# Patient Record
Sex: FEMALE | Race: WHITE | NOT HISPANIC OR LATINO | URBAN - METROPOLITAN AREA
[De-identification: names, ages, dates, MRNs, and addresses within clinical notes are randomized per-mention and may not be internally consistent; named-entity substitution may affect disease eponyms.]

---

## 2020-09-08 ENCOUNTER — APPOINTMENT (RX ONLY)
Dept: URBAN - METROPOLITAN AREA CLINIC 374 | Facility: CLINIC | Age: 40
Setting detail: DERMATOLOGY
End: 2020-09-08

## 2020-09-08 DIAGNOSIS — L73.9 FOLLICULAR DISORDER, UNSPECIFIED: ICD-10-CM

## 2020-09-08 DIAGNOSIS — L738 OTHER SPECIFIED DISEASES OF HAIR AND HAIR FOLLICLES: ICD-10-CM

## 2020-09-08 DIAGNOSIS — L663 OTHER SPECIFIED DISEASES OF HAIR AND HAIR FOLLICLES: ICD-10-CM

## 2020-09-08 PROBLEM — L02.221 FURUNCLE OF ABDOMINAL WALL: Status: ACTIVE | Noted: 2020-09-08

## 2020-09-08 PROCEDURE — ? PRESCRIPTION MEDICATION MANAGEMENT

## 2020-09-08 PROCEDURE — 99203 OFFICE O/P NEW LOW 30 MIN: CPT

## 2020-09-08 PROCEDURE — ? COUNSELING

## 2020-09-08 PROCEDURE — ? ORDER TESTS

## 2020-09-08 PROCEDURE — ? PRESCRIPTION

## 2020-09-08 RX ORDER — CHLORHEXIDINE GLUCONATE 213 G/1000ML
SOLUTION TOPICAL
Qty: 1 | Refills: 3 | Status: ERX | COMMUNITY
Start: 2020-09-08

## 2020-09-08 RX ORDER — SULFAMETHOXAZOLE AND TRIMETHOPRIM 800; 160 MG/1; MG/1
TABLET ORAL BID
Qty: 60 | Refills: 3 | Status: ERX | COMMUNITY
Start: 2020-09-08

## 2020-09-08 RX ORDER — TRIAMCINOLONE ACETONIDE 1 MG/G
OINTMENT TOPICAL
Qty: 1 | Refills: 3 | Status: ERX | COMMUNITY
Start: 2020-09-08

## 2020-09-08 RX ORDER — MUPIROCIN 20 MG/G
OINTMENT TOPICAL BID
Qty: 1 | Refills: 3 | Status: ERX | COMMUNITY
Start: 2020-09-08

## 2020-09-08 RX ADMIN — SULFAMETHOXAZOLE AND TRIMETHOPRIM: 800; 160 TABLET ORAL at 00:00

## 2020-09-08 RX ADMIN — TRIAMCINOLONE ACETONIDE: 1 OINTMENT TOPICAL at 00:00

## 2020-09-08 RX ADMIN — MUPIROCIN: 20 OINTMENT TOPICAL at 00:00

## 2020-09-08 RX ADMIN — CHLORHEXIDINE GLUCONATE: 213 SOLUTION TOPICAL at 00:00

## 2020-09-08 ASSESSMENT — LOCATION SIMPLE DESCRIPTION DERM: LOCATION SIMPLE: ABDOMEN

## 2020-09-08 ASSESSMENT — LOCATION DETAILED DESCRIPTION DERM
LOCATION DETAILED: LEFT LATERAL ABDOMEN
LOCATION DETAILED: PERIUMBILICAL SKIN

## 2020-09-08 ASSESSMENT — LOCATION ZONE DERM: LOCATION ZONE: TRUNK

## 2020-09-08 NOTE — PROCEDURE: PRESCRIPTION MEDICATION MANAGEMENT
Detail Level: Zone
Initiate Treatment: mupirocin 2 % topical ointment Bid; Apply to aa bid mix with Triamcinolone\\n\\nBactrim  mg-160 mg tablet Bid; well Take one tab po bid\\n\\ntriamcinolone acetonide 0.1 % topical ointment; Apply to affected area bid mix with mupirocin\\n\\nHibiclens 4 % topical liquid; Wash affected area of body qday
Render In Strict Bullet Format?: No

## 2020-10-06 ENCOUNTER — APPOINTMENT (RX ONLY)
Dept: URBAN - METROPOLITAN AREA CLINIC 374 | Facility: CLINIC | Age: 40
Setting detail: DERMATOLOGY
End: 2020-10-06

## 2020-10-06 DIAGNOSIS — L738 OTHER SPECIFIED DISEASES OF HAIR AND HAIR FOLLICLES: ICD-10-CM | Status: IMPROVED

## 2020-10-06 DIAGNOSIS — L73.9 FOLLICULAR DISORDER, UNSPECIFIED: ICD-10-CM | Status: IMPROVED

## 2020-10-06 DIAGNOSIS — K12.0 RECURRENT ORAL APHTHAE: ICD-10-CM

## 2020-10-06 DIAGNOSIS — L53.8 OTHER SPECIFIED ERYTHEMATOUS CONDITIONS: ICD-10-CM

## 2020-10-06 DIAGNOSIS — L24.9 IRRITANT CONTACT DERMATITIS, UNSPECIFIED CAUSE: ICD-10-CM

## 2020-10-06 DIAGNOSIS — L663 OTHER SPECIFIED DISEASES OF HAIR AND HAIR FOLLICLES: ICD-10-CM | Status: IMPROVED

## 2020-10-06 PROBLEM — L02.221 FURUNCLE OF ABDOMINAL WALL: Status: ACTIVE | Noted: 2020-10-06

## 2020-10-06 PROCEDURE — ? PRESCRIPTION

## 2020-10-06 PROCEDURE — ? ORDER TESTS

## 2020-10-06 PROCEDURE — 99213 OFFICE O/P EST LOW 20 MIN: CPT

## 2020-10-06 PROCEDURE — ? COUNSELING

## 2020-10-06 PROCEDURE — ? PRESCRIPTION MEDICATION MANAGEMENT

## 2020-10-06 PROCEDURE — ? INTRALESIONAL KENALOG

## 2020-10-06 RX ORDER — TRIAMCINOLONE ACETONIDE 1 MG/G
CREAM TOPICAL BID
Qty: 1 | Refills: 2 | Status: ERX | COMMUNITY
Start: 2020-10-06

## 2020-10-06 RX ORDER — SULFAMETHOXAZOLE AND TRIMETHOPRIM 800; 160 MG/1; MG/1
TABLET ORAL BID
Qty: 60 | Refills: 3 | Status: ACTIVE

## 2020-10-06 RX ORDER — MUPIROCIN 20 MG/G
OINTMENT TOPICAL BID
Qty: 1 | Refills: 3 | Status: ACTIVE

## 2020-10-06 RX ORDER — TRIAMCINOLONE ACETONIDE 1 MG/G
OINTMENT TOPICAL
Qty: 1 | Refills: 3 | Status: ACTIVE

## 2020-10-06 RX ORDER — TRIAMCINOLONE ACETONIDE 1 MG/G
PASTE DENTAL
Qty: 1 | Refills: 3 | Status: ERX | COMMUNITY
Start: 2020-10-06

## 2020-10-06 RX ORDER — CHLORHEXIDINE GLUCONATE 213 G/1000ML
SOLUTION TOPICAL
Qty: 1 | Refills: 3 | Status: ACTIVE

## 2020-10-06 RX ADMIN — TRIAMCINOLONE ACETONIDE: 1 CREAM TOPICAL at 00:00

## 2020-10-06 RX ADMIN — TRIAMCINOLONE ACETONIDE: 1 PASTE DENTAL at 00:00

## 2020-10-06 ASSESSMENT — LOCATION SIMPLE DESCRIPTION DERM
LOCATION SIMPLE: ABDOMEN
LOCATION SIMPLE: RIGHT BREAST
LOCATION SIMPLE: CHEST

## 2020-10-06 ASSESSMENT — LOCATION DETAILED DESCRIPTION DERM
LOCATION DETAILED: PERIUMBILICAL SKIN
LOCATION DETAILED: EPIGASTRIC SKIN
LOCATION DETAILED: LEFT LATERAL ABDOMEN
LOCATION DETAILED: LEFT MEDIAL SUPERIOR CHEST
LOCATION DETAILED: RIGHT MEDIAL BREAST 3-4:00 REGION

## 2020-10-06 ASSESSMENT — LOCATION ZONE DERM: LOCATION ZONE: TRUNK

## 2020-10-06 NOTE — PROCEDURE: INTRALESIONAL KENALOG
X Size Of Lesion In Cm (Optional): 0
Administered By (Optional): Leesa
Kenalog Preparation: Kenalog
Detail Level: Detailed
Concentration (Mg/Ml): 5.0
Consent: The risks of atrophy were reviewed with the patient.
Include Z78.9 (Other Specified Conditions Influencing Health Status) As An Associated Diagnosis?: No
Total Volume (Ccs- Only Use Numbers And Decimals): 1
Medical Necessity Clause: This procedure was medically necessary because the lesions that were treated were:

## 2020-10-06 NOTE — PROCEDURE: PRESCRIPTION MEDICATION MANAGEMENT
Detail Level: Zone
Initiate Treatment: mupirocin 2 % topical ointment Bid; Apply to aa bid mix with Triamcinolone\\n\\nBactrim  mg-160 mg tablet Bid; well Take one tab po bid\\n\\ntriamcinolone acetonide 0.1 % topical ointment; Apply to affected area bid mix with mupirocin\\n\\nHibiclens 4 % topical liquid; Wash affected area of body qday
Render In Strict Bullet Format?: No
Initiate Treatment: TAC 0.1% cream BID

## 2020-11-03 ENCOUNTER — APPOINTMENT (RX ONLY)
Dept: URBAN - METROPOLITAN AREA CLINIC 374 | Facility: CLINIC | Age: 40
Setting detail: DERMATOLOGY
End: 2020-11-03

## 2020-11-03 DIAGNOSIS — L21.8 OTHER SEBORRHEIC DERMATITIS: ICD-10-CM

## 2020-11-03 DIAGNOSIS — L738 OTHER SPECIFIED DISEASES OF HAIR AND HAIR FOLLICLES: ICD-10-CM | Status: IMPROVED

## 2020-11-03 DIAGNOSIS — K12.0 RECURRENT ORAL APHTHAE: ICD-10-CM | Status: IMPROVED

## 2020-11-03 DIAGNOSIS — L73.9 FOLLICULAR DISORDER, UNSPECIFIED: ICD-10-CM | Status: IMPROVED

## 2020-11-03 DIAGNOSIS — L663 OTHER SPECIFIED DISEASES OF HAIR AND HAIR FOLLICLES: ICD-10-CM | Status: IMPROVED

## 2020-11-03 DIAGNOSIS — L24.9 IRRITANT CONTACT DERMATITIS, UNSPECIFIED CAUSE: ICD-10-CM | Status: IMPROVED

## 2020-11-03 PROBLEM — L02.221 FURUNCLE OF ABDOMINAL WALL: Status: ACTIVE | Noted: 2020-11-03

## 2020-11-03 PROCEDURE — ? COUNSELING

## 2020-11-03 PROCEDURE — ? PRESCRIPTION

## 2020-11-03 PROCEDURE — ? INTRALESIONAL KENALOG

## 2020-11-03 PROCEDURE — ? PRESCRIPTION MEDICATION MANAGEMENT

## 2020-11-03 PROCEDURE — 99213 OFFICE O/P EST LOW 20 MIN: CPT

## 2020-11-03 RX ORDER — CLOBETASOL PROPIONATE 0.5 MG/ML
SOLUTION TOPICAL QHS
Qty: 1 | Refills: 3 | Status: ERX | COMMUNITY
Start: 2020-11-03

## 2020-11-03 RX ADMIN — CLOBETASOL PROPIONATE: 0.5 SOLUTION TOPICAL at 00:00

## 2020-11-03 ASSESSMENT — LOCATION SIMPLE DESCRIPTION DERM
LOCATION SIMPLE: POSTERIOR SCALP
LOCATION SIMPLE: ABDOMEN
LOCATION SIMPLE: RIGHT CHEEK
LOCATION SIMPLE: CHEST
LOCATION SIMPLE: LEFT CHEEK

## 2020-11-03 ASSESSMENT — LOCATION DETAILED DESCRIPTION DERM
LOCATION DETAILED: RIGHT MEDIAL BUCCAL CHEEK
LOCATION DETAILED: LEFT LATERAL ABDOMEN
LOCATION DETAILED: POSTERIOR MID-PARIETAL SCALP
LOCATION DETAILED: PERIUMBILICAL SKIN
LOCATION DETAILED: LEFT MEDIAL SUPERIOR CHEST
LOCATION DETAILED: LEFT MEDIAL BUCCAL CHEEK

## 2020-11-03 ASSESSMENT — LOCATION ZONE DERM
LOCATION ZONE: SCALP
LOCATION ZONE: FACE
LOCATION ZONE: TRUNK

## 2020-11-03 NOTE — PROCEDURE: PRESCRIPTION MEDICATION MANAGEMENT
Continue Regimen: mupirocin 2 % topical ointment Bid; Apply to aa bid mix with Triamcinolone\\nBactrim  mg-160 mg tablet Bid; well Take one tab po bid\\ntriamcinolone acetonide 0.1 % topical ointment; Apply to affected area bid mix with mupirocin\\nHibiclens 4 % topical liquid; Wash affected area of body qday
Detail Level: Zone
Render In Strict Bullet Format?: No
Continue Regimen: TAC 0.1% cream BID
Continue Regimen: triamcinolone acetonide 0.1 % dental paste, Apply BID to AA around mouth
Detail Level: Simple
Initiate Treatment: Clobetasol 0.05% scalp solution qhs to scalp
Otc Regimen: Dove [facility] shampoo

## 2020-11-03 NOTE — PROCEDURE: INTRALESIONAL KENALOG
X Size Of Lesion In Cm (Optional): 0
Medical Necessity Clause: This procedure was medically necessary because the lesions that were treated were:
Detail Level: Simple
Treatment Number (Optional): 2
Include Z78.9 (Other Specified Conditions Influencing Health Status) As An Associated Diagnosis?: No
Concentration (Mg/Ml): 5.0
Kenalog Preparation: Kenalog
Total Volume (Ccs- Only Use Numbers And Decimals): 0.7
Consent: The risks of atrophy were reviewed with the patient.
Administered By (Optional): Melanie

## 2020-12-01 ENCOUNTER — APPOINTMENT (RX ONLY)
Dept: URBAN - METROPOLITAN AREA CLINIC 374 | Facility: CLINIC | Age: 40
Setting detail: DERMATOLOGY
End: 2020-12-01

## 2020-12-01 DIAGNOSIS — L738 OTHER SPECIFIED DISEASES OF HAIR AND HAIR FOLLICLES: ICD-10-CM

## 2020-12-01 DIAGNOSIS — L73.9 FOLLICULAR DISORDER, UNSPECIFIED: ICD-10-CM

## 2020-12-01 DIAGNOSIS — L663 OTHER SPECIFIED DISEASES OF HAIR AND HAIR FOLLICLES: ICD-10-CM

## 2020-12-01 DIAGNOSIS — L72.8 OTHER FOLLICULAR CYSTS OF THE SKIN AND SUBCUTANEOUS TISSUE: ICD-10-CM

## 2020-12-01 DIAGNOSIS — L24.9 IRRITANT CONTACT DERMATITIS, UNSPECIFIED CAUSE: ICD-10-CM

## 2020-12-01 PROBLEM — L02.221 FURUNCLE OF ABDOMINAL WALL: Status: ACTIVE | Noted: 2020-12-01

## 2020-12-01 PROCEDURE — ? PRESCRIPTION MEDICATION MANAGEMENT

## 2020-12-01 PROCEDURE — 99213 OFFICE O/P EST LOW 20 MIN: CPT | Mod: 25

## 2020-12-01 PROCEDURE — 11900 INJECT SKIN LESIONS </W 7: CPT

## 2020-12-01 PROCEDURE — ? INTRALESIONAL KENALOG

## 2020-12-01 ASSESSMENT — LOCATION ZONE DERM
LOCATION ZONE: TRUNK
LOCATION ZONE: FACE

## 2020-12-01 ASSESSMENT — LOCATION DETAILED DESCRIPTION DERM
LOCATION DETAILED: LEFT MEDIAL SUPERIOR CHEST
LOCATION DETAILED: LEFT LATERAL ABDOMEN
LOCATION DETAILED: LEFT SUPRAPUBIC SKIN
LOCATION DETAILED: PERIUMBILICAL SKIN
LOCATION DETAILED: RIGHT INFERIOR LATERAL FOREHEAD

## 2020-12-01 ASSESSMENT — LOCATION SIMPLE DESCRIPTION DERM
LOCATION SIMPLE: GROIN
LOCATION SIMPLE: RIGHT FOREHEAD
LOCATION SIMPLE: CHEST
LOCATION SIMPLE: ABDOMEN

## 2020-12-01 NOTE — PROCEDURE: PRESCRIPTION MEDICATION MANAGEMENT
Continue Regimen: mupirocin 2 % topical ointment Bid; Apply to aa bid mix with Triamcinolone\\nBactrim  mg-160 mg tablet Bid; well Take one tab po bid\\ntriamcinolone acetonide 0.1 % topical ointment; Apply to affected area bid mix with mupirocin\\nHibiclens 4 % topical liquid; Wash affected area of body qday
Detail Level: Zone
Render In Strict Bullet Format?: No
Continue Regimen: TAC 0.1% cream BID

## 2020-12-01 NOTE — PROCEDURE: INTRALESIONAL KENALOG
X Size Of Lesion In Cm (Optional): 0
Medical Necessity Clause: This procedure was medically necessary because the lesions that were treated were:
Detail Level: Simple
Treatment Number (Optional): 3
Include Z78.9 (Other Specified Conditions Influencing Health Status) As An Associated Diagnosis?: No
Concentration (Mg/Ml): 5.0
Kenalog Preparation: Kenalog
Total Volume (Ccs- Only Use Numbers And Decimals): 0.7
Consent: The risks of atrophy were reviewed with the patient.
Administered By (Optional): Leesa
Detail Level: Detailed
Concentration Of Solution Injected (Mg/Ml): 2.5
Total Volume Injected (Ccs- Only Use Numbers And Decimals): 1
Administered By (Optional): Leesa

## 2021-01-21 ENCOUNTER — RX ONLY (OUTPATIENT)
Age: 41
Setting detail: RX ONLY
End: 2021-01-21

## 2021-01-21 RX ORDER — SULFAMETHOXAZOLE AND TRIMETHOPRIM 800; 160 MG/1; MG/1
TABLET ORAL BID
Qty: 60 | Refills: 0 | Status: ERX

## 2021-12-15 RX ADMIN — SILVER SULFADIAZINE 1: 10 CREAM TOPICAL at 00:00

## 2021-12-15 RX ADMIN — Medication 1: at 00:00

## 2021-12-15 RX ADMIN — CLOBETASOL PROPIONATE 1: 0.5 OINTMENT TOPICAL at 00:00

## 2021-12-16 ENCOUNTER — APPOINTMENT (RX ONLY)
Dept: URBAN - METROPOLITAN AREA CLINIC 374 | Facility: CLINIC | Age: 41
Setting detail: DERMATOLOGY
End: 2021-12-16

## 2021-12-16 DIAGNOSIS — L30.8 OTHER SPECIFIED DERMATITIS: ICD-10-CM | Status: INADEQUATELY CONTROLLED

## 2021-12-16 PROCEDURE — ? PRESCRIPTION

## 2021-12-16 PROCEDURE — ? PRESCRIPTION MEDICATION MANAGEMENT

## 2021-12-16 PROCEDURE — ? RECOMMENDATIONS

## 2021-12-16 PROCEDURE — ? COUNSELING

## 2021-12-16 PROCEDURE — ? ADDITIONAL NOTES

## 2021-12-16 PROCEDURE — 99214 OFFICE O/P EST MOD 30 MIN: CPT

## 2021-12-16 RX ORDER — SILVER SULFADIAZINE 10 MG/G
1 CREAM TOPICAL QDAY
Qty: 1000 | Refills: 3 | Status: ERX | COMMUNITY
Start: 2021-12-15

## 2021-12-16 RX ORDER — CLOBETASOL PROPIONATE 0.5 MG/G
1 OINTMENT TOPICAL BID
Qty: 60 | Refills: 3 | Status: ERX | COMMUNITY
Start: 2021-12-15

## 2021-12-16 RX ORDER — CETIRIZINE HCL 10 MG
1 CAPSULE ORAL QAM
Qty: 30 | Refills: 3 | Status: ERX | COMMUNITY
Start: 2021-12-15

## 2021-12-16 ASSESSMENT — LOCATION SIMPLE DESCRIPTION DERM: LOCATION SIMPLE: ABDOMEN

## 2021-12-16 ASSESSMENT — LOCATION DETAILED DESCRIPTION DERM: LOCATION DETAILED: PERIUMBILICAL SKIN

## 2021-12-16 ASSESSMENT — LOCATION ZONE DERM: LOCATION ZONE: TRUNK

## 2021-12-16 NOTE — PROCEDURE: RECOMMENDATIONS
Recommendations (Free Text): STOP PICKING
Recommendation Preamble: The following recommendations were made during the visit:
Detail Level: Zone
Render Risk Assessment In Note?: no

## 2021-12-16 NOTE — PROCEDURE: MIPS QUALITY
Quality 226: Preventive Care And Screening: Tobacco Use: Screening And Cessation Intervention: Patient screened for tobacco use, is a smoker AND received Cessation Counseling
Quality 130: Documentation Of Current Medications In The Medical Record: Current Medications Documented
Quality 431: Preventive Care And Screening: Unhealthy Alcohol Use - Screening: Patient not identified as an unhealthy alcohol user when screened for unhealthy alcohol use using a systematic screening method
Detail Level: Detailed

## 2021-12-16 NOTE — PROCEDURE: ADDITIONAL NOTES
Additional Notes: Patient consent was obtained to proceed with the visit and recommended plan of care after discussion of all risks and benefits, including the risks of COVID-19 exposure.
Render Risk Assessment In Note?: yes
Detail Level: Zone

## 2021-12-16 NOTE — PROCEDURE: PRESCRIPTION MEDICATION MANAGEMENT
Detail Level: Zone
Initiate Treatment: cetirizine 10mg capsule: Take one tab po QAM\\nSilvadene 1% topical cream: Apply thin layer to stomach area qday\\nclobetasol 0.05% topical ointment: Apply a thin layer to aa of body bid for itching
Render In Strict Bullet Format?: No

## 2022-03-14 ENCOUNTER — TELEPHONE (OUTPATIENT)
Dept: ENDOCRINOLOGY | Facility: CLINIC | Age: 42
End: 2022-03-14

## 2022-03-14 NOTE — TELEPHONE ENCOUNTER
Patient is requesting a call back to schedule a new patient apt , she has [employer] insurance and did not have her id # , she will have her id # when she receives the return call.  She was referred by Dr. Rosey Velasco for irregular periods she has not had a period in 2 months    yes

## 2022-05-18 ENCOUNTER — TELEPHONE (OUTPATIENT)
Dept: ENDOCRINOLOGY | Facility: CLINIC | Age: 42
End: 2022-05-18
Payer: COMMERCIAL

## 2022-05-18 DIAGNOSIS — N91.2 AMENORRHEA: ICD-10-CM

## 2022-05-18 DIAGNOSIS — R73.03 PREDIABETES: ICD-10-CM

## 2022-05-18 DIAGNOSIS — R79.89 ABNORMAL THYROID BLOOD TEST: Primary | ICD-10-CM

## 2022-05-18 NOTE — TELEPHONE ENCOUNTER
Spoke to patient about labs and office notes.      Not seeing anything in chart    She is going to call her doctor's office and have them send them over.    Will call back to confirm

## 2022-05-19 ENCOUNTER — OFFICE VISIT (OUTPATIENT)
Dept: ENDOCRINOLOGY | Facility: CLINIC | Age: 42
End: 2022-05-19
Payer: COMMERCIAL

## 2022-05-19 VITALS
DIASTOLIC BLOOD PRESSURE: 70 MMHG | HEART RATE: 90 BPM | WEIGHT: 293 LBS | RESPIRATION RATE: 22 BRPM | OXYGEN SATURATION: 91 % | SYSTOLIC BLOOD PRESSURE: 120 MMHG | HEIGHT: 63 IN | BODY MASS INDEX: 51.91 KG/M2 | TEMPERATURE: 97.8 F

## 2022-05-19 DIAGNOSIS — N91.1 SECONDARY AMENORRHEA: ICD-10-CM

## 2022-05-19 DIAGNOSIS — R73.03 PREDIABETES: ICD-10-CM

## 2022-05-19 DIAGNOSIS — R79.89 ABNORMAL THYROID BLOOD TEST: Primary | ICD-10-CM

## 2022-05-19 DIAGNOSIS — Z71.6 ENCOUNTER FOR SMOKING CESSATION COUNSELING: ICD-10-CM

## 2022-05-19 DIAGNOSIS — E66.01 MORBID OBESITY (CMS/HCC): ICD-10-CM

## 2022-05-19 PROBLEM — M54.50 CHRONIC BILATERAL LOW BACK PAIN WITHOUT SCIATICA: Status: ACTIVE | Noted: 2020-04-02

## 2022-05-19 PROBLEM — S92.403A: Status: ACTIVE | Noted: 2018-07-08

## 2022-05-19 PROBLEM — G47.34 NOCTURNAL OXYGEN DESATURATION: Status: ACTIVE | Noted: 2022-04-01

## 2022-05-19 PROBLEM — K82.9 GALLBLADDER PROBLEM: Status: ACTIVE | Noted: 2021-10-05

## 2022-05-19 PROBLEM — R60.0 EDEMA OF LOWER EXTREMITY: Status: ACTIVE | Noted: 2021-04-19

## 2022-05-19 PROBLEM — I82.90 THROMBUS: Status: ACTIVE | Noted: 2022-05-19

## 2022-05-19 PROBLEM — G47.33 OSA TREATED WITH BIPAP: Status: ACTIVE | Noted: 2021-05-19

## 2022-05-19 PROBLEM — M77.32 HEEL SPUR, LEFT: Status: ACTIVE | Noted: 2020-08-10

## 2022-05-19 PROBLEM — F33.1 MODERATE EPISODE OF RECURRENT MAJOR DEPRESSIVE DISORDER (CMS/HCC): Status: ACTIVE | Noted: 2021-05-19

## 2022-05-19 PROBLEM — G89.29 CHRONIC BILATERAL LOW BACK PAIN WITHOUT SCIATICA: Status: ACTIVE | Noted: 2020-04-02

## 2022-05-19 PROBLEM — M17.0 OSTEOARTHRITIS OF BOTH KNEES: Status: ACTIVE | Noted: 2020-03-04

## 2022-05-19 PROBLEM — G43.909 MIGRAINE: Status: ACTIVE | Noted: 2018-07-08

## 2022-05-19 PROBLEM — R11.0 NAUSEA: Status: ACTIVE | Noted: 2018-07-08

## 2022-05-19 PROBLEM — Z99.81 ON HOME OXYGEN THERAPY: Status: ACTIVE | Noted: 2022-01-17

## 2022-05-19 PROBLEM — R06.09 DYSPNEA ON EXERTION: Status: ACTIVE | Noted: 2021-04-19

## 2022-05-19 PROBLEM — J44.9 COPD (CHRONIC OBSTRUCTIVE PULMONARY DISEASE) (CMS/HCC): Status: ACTIVE | Noted: 2020-03-10

## 2022-05-19 PROBLEM — N84.0 POLYP OF CORPUS UTERI: Status: ACTIVE | Noted: 2018-07-08

## 2022-05-19 PROBLEM — J96.11 CHRONIC HYPOXEMIC RESPIRATORY FAILURE (CMS/HCC): Status: ACTIVE | Noted: 2021-07-08

## 2022-05-19 PROBLEM — I82.402 LEFT LEG DVT (CMS/HCC): Status: ACTIVE | Noted: 2022-01-18

## 2022-05-19 PROBLEM — M67.972 DISORDER OF LEFT ACHILLES TENDON: Status: ACTIVE | Noted: 2020-11-07

## 2022-05-19 PROBLEM — K08.199 SURGICAL ABSENCE OF TEETH: Status: ACTIVE | Noted: 2022-05-19

## 2022-05-19 PROBLEM — F41.1 GAD (GENERALIZED ANXIETY DISORDER): Status: ACTIVE | Noted: 2021-05-19

## 2022-05-19 PROBLEM — J45.909 ASTHMA: Status: ACTIVE | Noted: 2018-07-08

## 2022-05-19 PROBLEM — F12.90 MARIJUANA USE: Status: ACTIVE | Noted: 2020-02-03

## 2022-05-19 PROBLEM — F32.A DEPRESSIVE DISORDER: Status: ACTIVE | Noted: 2018-07-08

## 2022-05-19 PROBLEM — D64.9 ANEMIA: Status: ACTIVE | Noted: 2020-09-24

## 2022-05-19 PROBLEM — R26.2 DISABILITY OF WALKING: Status: ACTIVE | Noted: 2021-04-19

## 2022-05-19 PROBLEM — R44.1 VISUAL HALLUCINATIONS: Status: ACTIVE | Noted: 2021-05-19

## 2022-05-19 PROBLEM — R06.83 SNORING: Status: ACTIVE | Noted: 2021-10-06

## 2022-05-19 PROBLEM — S96.919A STRAIN OF FOOT: Status: ACTIVE | Noted: 2018-07-08

## 2022-05-19 PROBLEM — Z90.49 HISTORY OF CHOLECYSTECTOMY: Status: ACTIVE | Noted: 2021-03-29

## 2022-05-19 PROBLEM — L73.2 HIDRADENITIS SUPPURATIVA: Status: ACTIVE | Noted: 2020-08-07

## 2022-05-19 PROCEDURE — 99406 BEHAV CHNG SMOKING 3-10 MIN: CPT | Performed by: INTERNAL MEDICINE

## 2022-05-19 PROCEDURE — 99204 OFFICE O/P NEW MOD 45 MIN: CPT | Mod: 25 | Performed by: INTERNAL MEDICINE

## 2022-05-19 PROCEDURE — 3008F BODY MASS INDEX DOCD: CPT | Performed by: INTERNAL MEDICINE

## 2022-05-19 RX ORDER — IBUPROFEN 200 MG
TABLET ORAL
COMMUNITY
Start: 2021-12-10 | End: 2023-03-15

## 2022-05-19 RX ORDER — OMEPRAZOLE 20 MG/1
20 CAPSULE, DELAYED RELEASE ORAL DAILY PRN
COMMUNITY
Start: 2022-02-01 | End: 2023-03-15

## 2022-05-19 RX ORDER — QUETIAPINE FUMARATE 400 MG/1
400 TABLET, FILM COATED ORAL NIGHTLY
COMMUNITY
Start: 2022-05-16

## 2022-05-19 RX ORDER — FLUTICASONE PROPIONATE AND SALMETEROL XINAFOATE 115; 21 UG/1; UG/1
AEROSOL, METERED RESPIRATORY (INHALATION)
COMMUNITY
Start: 2022-01-09

## 2022-05-19 RX ORDER — FUROSEMIDE 40 MG/1
40 TABLET ORAL DAILY PRN
COMMUNITY
Start: 2021-12-20 | End: 2023-03-15

## 2022-05-19 RX ORDER — GABAPENTIN 100 MG/1
100 CAPSULE ORAL 3 TIMES DAILY
COMMUNITY
Start: 2022-05-16

## 2022-05-19 RX ORDER — DICLOFENAC SODIUM 10 MG/G
GEL TOPICAL
COMMUNITY
Start: 2021-11-17 | End: 2023-03-15

## 2022-05-19 RX ORDER — TOPIRAMATE 100 MG/1
100 TABLET, FILM COATED ORAL DAILY
COMMUNITY
Start: 2022-03-01

## 2022-05-19 RX ORDER — GABAPENTIN 400 MG/1
400 CAPSULE ORAL
COMMUNITY
Start: 2022-04-12

## 2022-05-19 RX ORDER — SILVER SULFADIAZINE 10 G/1000G
CREAM TOPICAL
COMMUNITY
Start: 2022-04-01 | End: 2023-03-15

## 2022-05-19 RX ORDER — ALBUTEROL SULFATE 90 UG/1
INHALANT RESPIRATORY (INHALATION)
COMMUNITY
Start: 2022-05-09

## 2022-05-19 RX ORDER — VENLAFAXINE HYDROCHLORIDE 150 MG/1
150 CAPSULE, EXTENDED RELEASE ORAL EVERY MORNING
COMMUNITY
Start: 2022-04-12

## 2022-05-19 RX ORDER — CLOBETASOL PROPIONATE 0.5 MG/G
OINTMENT TOPICAL
COMMUNITY
Start: 2022-04-01 | End: 2023-03-15

## 2022-05-19 RX ORDER — BUSPIRONE HYDROCHLORIDE 15 MG/1
15 TABLET ORAL 2 TIMES DAILY
COMMUNITY
Start: 2022-05-16

## 2022-05-19 RX ORDER — DOXEPIN HYDROCHLORIDE 10 MG/1
1 CAPSULE ORAL NIGHTLY
COMMUNITY
Start: 2022-05-16

## 2022-05-19 NOTE — LETTER
May 20, 2022     Rosey Velasco DO  [address] [facility] [address]    Patient: Tiffanie Waters  YOB: 1980  Date of Visit: 5/19/2022      Dear Dr. Velasco:    Thank you for referring Tiffanie Waters to me for evaluation. Below are my notes for this consultation.    If you have questions, please do not hesitate to call me. I look forward to following your patient along with you.         Sincerely,        Maco Anderson MD        CC: No Recipients  Maco Anderson MD  5/20/2022 12:00 PM  Signed  Tiffanie Waters is a 42 y.o. female who presents today for evaluation and management of hormone dysfunction. Referred by Rosey Velasco DO.     Patient was accompanied by her fiancé who provided additional history  poor historian    Review of labs noted abnormal low free T4 dating back to oct 21    History  Feb 2021 she had secondary Amenorrhea,  Hormone pill resumed cycles. She followed Dr. Avril Sanders at [facility].  Feb 2022 she had no cycle and march 1 day having bleeding for 1 day and no cycle since then.  She had blood work in March and did not follow-up with GYN as she did not want hormonal pills to resume cycles    H.o of severe morbid obesity, JANICE on BiPAP followed by Pulmonology, anxiety, depression, asthma, mariajuana use, tobacco use, LLE DVT on Eliquis, b/l knee OA, HDS, chronic hypoxemia on nocturnal oxygen, low progesterone, prediabetes, ambulatory dysfunction    Menarche: 13  Menstrual cycles: reg,  2014 uterine polyp irreg and resolved. Feb 2021 - had secondary amenorrhea and Hormone pill resumed cycles.  Recurrence of ammenorrhea since feb 2022    LMP above  Pregnancy no,  plans yes    H/o acne: from CPAP  H/o Hirsutism: no    Weight gain: heavy since childhood, lost 40 # and gained back  Cold intolerance no, more heat, constipation  no    H/o Fractures/Osteoporosis: as adult no  Wide abdominal Striae: no  H/o Proximal muscle weakness: no  H/o Chronic /Recent Steroid Use no  H/o HTN  no  H/o DM predm  Excessive skin fragility no    Galactorrhea no  Headaches ns  Tunnel vision no  Change in shoe size no  Insomnia JANICE, BIPAP.         Past Medical History:   Diagnosis Date   • Absence of menstruation      Past Surgical History:   Procedure Laterality Date   • CERVICAL POLYPECTOMY     • WISDOM TOOTH EXTRACTION       Family History   Problem Relation Age of Onset   • Asthma Biological Mother    • Hyperlipidemia Biological Mother    • Hypertension Biological Mother    • Thyroid disease Biological Mother    • Diabetes Biological Mother    • Clotting disorder Biological Mother    • Kidney disease Biological Father    • Hyperlipidemia Biological Father    • Hypertension Biological Father    • Diabetes Biological Father    • Heart failure Biological Sister    • Asthma Biological Sister    • Hypertension Biological Sister    • Cancer Biological Sister    • Clotting disorder Biological Sister    • Cervical cancer Biological Sister    • Clotting disorder Maternal Grandmother    • Asthma Maternal Grandmother    • Uterine cancer Maternal Grandmother    • Kidney disease Maternal Grandfather    • Colon cancer Maternal Grandfather    • Lung cancer Maternal Grandfather    • Heart disease Paternal Grandmother    • Hip dysplasia Paternal Grandmother    • Stroke Paternal Grandfather    • Prostate cancer Paternal Grandfather      Current Outpatient Medications   Medication Sig Dispense Refill   • albuterol HFA (VENTOLIN HFA) 90 mcg/actuation inhaler INHALE 2 PUFFS BY MOUTH EVERY 4- 6 HOURS AS NEEDED     • apixaban (ELIQUIS) 5 mg tablet TAKE 1 TABLET(5 MG) BY MOUTH TWICE DAILY     • busPIRone (BUSPAR) 15 mg tablet Take 15 mg by mouth 2 (two) times a day.     • clobetasoL (TEMOVATE) 0.05 % ointment APPLY A THIN LAYER TO THE AFFECTED AREA TWICE DAILY FOR ITCHING     • diclofenac sodium (VOLTAREN) 1 % topical gel Apply topically.     • doxepin (SINEquan) 10 mg capsule Take 1 mg by mouth nightly.     • furosemide (LASIX)  "40 mg tablet Take 40 mg by mouth daily as needed.     • gabapentin (NEURONTIN) 100 mg capsule Take 100 mg by mouth 3 (three) times a day.     • omeprazole (PriLOSEC) 20 mg capsule Take 20 mg by mouth daily as needed.     • QUEtiapine (SEROquel) 400 mg tablet Take 400 mg by mouth nightly. 400 mg at night , 50 in am, 50 in aftrnoon     • SSD 1 % cream APPLY A THIN LAYER TO STOMACH AREA DAILY     • topiramate (TOPAMAX) 100 mg tablet Take 100 mg by mouth daily.     • venlafaxine XR (EFFEXOR-XR) 150 mg 24 hr capsule Take 150 mg by mouth every morning.     • fluticasone propion-salmeteroL (ADVAIR HFA) 115-21 mcg/actuation inhaler INHALE 2 PUFFS INTO THE LUNGS TWICE DAILY     • gabapentin (NEURONTIN) 400 mg capsule Take 400 mg by mouth 3 (three) times a day.     • nicotine (NICODERM CQ) 14 mg/24 hr APPLY 1 PATCH TOPICALLY TO THE SKIN DAILY       No current facility-administered medications for this visit.     No Known Allergies  Social History     Socioeconomic History   • Marital status: Unknown     Spouse name: None   • Number of children: None   • Years of education: None   • Highest education level: None   Tobacco Use   • Smoking status: Current Every Day Smoker     Packs/day: 0.50     Types: Cigarettes   • Smokeless tobacco: Never Used   Substance and Sexual Activity   • Alcohol use: Never       ROS:  The complete review of systems is otherwise negative except as noted in HPI.      PHYSICAL EXAM:  Vitals:    05/19/22 1108   BP: 120/70   BP Location: Left forearm   Patient Position: Sitting   Pulse: 90   Resp: (!) 22   Temp: 36.6 °C (97.8 °F)   SpO2: (!) 91%   Weight: (!) 177 kg (390 lb)   Height: 1.6 m (5' 3\")       Body mass index is 69.09 kg/m².    GENERAL: Well developed, well nourished, in no acute distress, morbidly obese  EYES: Extraocular movements intact, conjunctiva no chemosis, no proptosis, no lid lag, retraction  NECK: Supple, nl anterior cervical lymphadenopathy, no supraclavicular fat pad  THYROID: " thyroid palpable, difficult to palpate because of body habitus, no distinct nodules palpated, non tender on my exam today  CARDIOVASCULAR: S1, S2 heard  RESPIRATORY: Symmetrical chest expansion, normal respiratory effort, clear to auscultation bilaterally  GASTROINTESTINAL: Soft, non tender, wide striae no  MUSCULOSKELETAL: no cyanosis, normal muscle mass, no edema in lower extremities  SKIN: Warm, dry, no lesions  NEUROLOGIC: Awake, alert, DTR normal, tremors no    Outside records and notes: reviewed. Pertinent positives summarized in HPI    LABS:   3/11/22 - TSH 2.43, free T4 0.56L, prolactin 10.7, FSH 4.7, LH 5, A1c 6.4, estradiol 127, progesterone 0.2, beta-hCG <1, total testosterone 7, free testosterone 0.7 normal,    10/21 -calcium 9.3, A1c 6.3, free T4 0.54, TSH 4, thyroglobulin antibody<1      No results found for: TSH, TSHFT4, R8HLYEO, F5XJENR, T3FREE, FREET4  No results found for: THYROIDAB, TSI  No results found for: ALT, WBC     Chemistry    No results found for: NA, K, CL, CO2, BUN, CREATININE, GLU No results found for: CALCIUM, ALKPHOS, AST, ALT, BILITOT          No results found for: TSH, Z2UVUUY, G0RCITQ, THYROIDAB  No results found for: WBC, HGB, HCT, MCV, PLT  No results found for: ALT, AST, GGT, ALKPHOS, BILITOT    IMAGING:     PATHOLOGY:    ASSESSMENT AND PLAN:  This is a 42 y.o. female here for consultation.    1.  Abnormal thyroid test/secondary ammenorrhea  TSH normal, low free T4 DD - lab variation, binding tissue, interfering substance, central hypothyroidism    Will exclude hypothyroidism, which could contribute to menstrual irregularity  She denied taking any supplements including biotin  Advised to repeat thyroid labs, check TSH, free T4, free T4 by equilibrium dialysis  Check antibodies for Hashimoto's  If labs indicate of central hypothyroidism, will recommend MRI pituitary  Prolactin, total testosterone normal.  March labs fall in follicular/ovulatory phase. Obtain records from her  GYN  Advised to follow-up with GYN for HRT    2.  Prediabetes/morbid obesity  She will benefit from bariatric surgery  [facility]  We discussed metformin to help regulate cycles and prediabetes.  High risk for lactic acidosis secondary to history of chronic hypoxia needing nocturnal oxygen  Recheck A1c, BMP. consider GLP to aid with weight loss    3. Nicotine dependence, cigarettes, uncomplicated    Use of tobacco was reviewed.  I advised to quit and we also spoke about the impact of smoking.  I did assess their willingness to attempt to quit.  I provided methods for cessation.   I encouraged  to set quit date.  Amount of time counseling patient:  5 minutes.      I have answered all of my patient's questions to the best of my ability. To call us with any changes    Return to office in 3 months or earlier if issues arise     Orders Placed This Encounter   Procedures   • TSH 3rd Generation   • T4, free   • free T4 equilibrium dialysis - Miscellaneous Test   • TSH 3rd Generation   • T4, free   • Thyroid peroxidase antibody   • free T4 equilibrium dialysis - Miscellaneous Test   • Hemoglobin A1c   • Basic metabolic panel   • Basic metabolic panel   • Hemoglobin A1c   • BhCG, Serum, Quant   • Ambulatory referral to Nutrition Services       This note was sent to PCP    This patient has been dictated using speech recognition software. Inadvertent speech recognition errors should be disregarded. Please do not hesitate to call the office for clarification.

## 2022-05-19 NOTE — PROGRESS NOTES
Tiffanie Waters is a 42 y.o. female who presents today for evaluation and management of hormone dysfunction. Referred by Rosey Velasco DO.     Patient was accompanied by her fiancé who provided additional history  poor historian    Review of labs noted abnormal low free T4 dating back to oct 21    History  Feb 2021 she had secondary Amenorrhea,  Hormone pill resumed cycles. She followed Dr. Avril Sanders at [facility].  Feb 2022 she had no cycle and march 1 day having bleeding for 1 day and no cycle since then.  She had blood work in March and did not follow-up with GYN as she did not want hormonal pills to resume cycles    H.o of severe morbid obesity, JANICE on BiPAP followed by Pulmonology, anxiety, depression, asthma, mariajuana use, tobacco use, LLE DVT on Eliquis, b/l knee OA, HDS, chronic hypoxemia on nocturnal oxygen, low progesterone, prediabetes, ambulatory dysfunction    Menarche: 13  Menstrual cycles: reg,  2014 uterine polyp irreg and resolved. Feb 2021 - had secondary amenorrhea and Hormone pill resumed cycles.  Recurrence of ammenorrhea since feb 2022    LMP above  Pregnancy no,  plans yes    H/o acne: from CPAP  H/o Hirsutism: no    Weight gain: heavy since childhood, lost 40 # and gained back  Cold intolerance no, more heat, constipation  no    H/o Fractures/Osteoporosis: as adult no  Wide abdominal Striae: no  H/o Proximal muscle weakness: no  H/o Chronic /Recent Steroid Use no  H/o HTN no  H/o DM predm  Excessive skin fragility no    Galactorrhea no  Headaches ns  Tunnel vision no  Change in shoe size no  Insomnia JANIEC, BIPAP.         Past Medical History:   Diagnosis Date   • Absence of menstruation      Past Surgical History:   Procedure Laterality Date   • CERVICAL POLYPECTOMY     • WISDOM TOOTH EXTRACTION       Family History   Problem Relation Age of Onset   • Asthma Biological Mother    • Hyperlipidemia Biological Mother    • Hypertension Biological Mother    • Thyroid disease Biological Mother     • Diabetes Biological Mother    • Clotting disorder Biological Mother    • Kidney disease Biological Father    • Hyperlipidemia Biological Father    • Hypertension Biological Father    • Diabetes Biological Father    • Heart failure Biological Sister    • Asthma Biological Sister    • Hypertension Biological Sister    • Cancer Biological Sister    • Clotting disorder Biological Sister    • Cervical cancer Biological Sister    • Clotting disorder Maternal Grandmother    • Asthma Maternal Grandmother    • Uterine cancer Maternal Grandmother    • Kidney disease Maternal Grandfather    • Colon cancer Maternal Grandfather    • Lung cancer Maternal Grandfather    • Heart disease Paternal Grandmother    • Hip dysplasia Paternal Grandmother    • Stroke Paternal Grandfather    • Prostate cancer Paternal Grandfather      Current Outpatient Medications   Medication Sig Dispense Refill   • albuterol HFA (VENTOLIN HFA) 90 mcg/actuation inhaler INHALE 2 PUFFS BY MOUTH EVERY 4- 6 HOURS AS NEEDED     • apixaban (ELIQUIS) 5 mg tablet TAKE 1 TABLET(5 MG) BY MOUTH TWICE DAILY     • busPIRone (BUSPAR) 15 mg tablet Take 15 mg by mouth 2 (two) times a day.     • clobetasoL (TEMOVATE) 0.05 % ointment APPLY A THIN LAYER TO THE AFFECTED AREA TWICE DAILY FOR ITCHING     • diclofenac sodium (VOLTAREN) 1 % topical gel Apply topically.     • doxepin (SINEquan) 10 mg capsule Take 1 mg by mouth nightly.     • furosemide (LASIX) 40 mg tablet Take 40 mg by mouth daily as needed.     • gabapentin (NEURONTIN) 100 mg capsule Take 100 mg by mouth 3 (three) times a day.     • omeprazole (PriLOSEC) 20 mg capsule Take 20 mg by mouth daily as needed.     • QUEtiapine (SEROquel) 400 mg tablet Take 400 mg by mouth nightly. 400 mg at night , 50 in am, 50 in aftrnoon     • SSD 1 % cream APPLY A THIN LAYER TO STOMACH AREA DAILY     • topiramate (TOPAMAX) 100 mg tablet Take 100 mg by mouth daily.     • venlafaxine XR (EFFEXOR-XR) 150 mg 24 hr capsule Take 150  "mg by mouth every morning.     • fluticasone propion-salmeteroL (ADVAIR HFA) 115-21 mcg/actuation inhaler INHALE 2 PUFFS INTO THE LUNGS TWICE DAILY     • gabapentin (NEURONTIN) 400 mg capsule Take 400 mg by mouth 3 (three) times a day.     • nicotine (NICODERM CQ) 14 mg/24 hr APPLY 1 PATCH TOPICALLY TO THE SKIN DAILY       No current facility-administered medications for this visit.     No Known Allergies  Social History     Socioeconomic History   • Marital status: Unknown     Spouse name: None   • Number of children: None   • Years of education: None   • Highest education level: None   Tobacco Use   • Smoking status: Current Every Day Smoker     Packs/day: 0.50     Types: Cigarettes   • Smokeless tobacco: Never Used   Substance and Sexual Activity   • Alcohol use: Never       ROS:  The complete review of systems is otherwise negative except as noted in HPI.      PHYSICAL EXAM:  Vitals:    05/19/22 1108   BP: 120/70   BP Location: Left forearm   Patient Position: Sitting   Pulse: 90   Resp: (!) 22   Temp: 36.6 °C (97.8 °F)   SpO2: (!) 91%   Weight: (!) 177 kg (390 lb)   Height: 1.6 m (5' 3\")       Body mass index is 69.09 kg/m².    GENERAL: Well developed, well nourished, in no acute distress, morbidly obese  EYES: Extraocular movements intact, conjunctiva no chemosis, no proptosis, no lid lag, retraction  NECK: Supple, nl anterior cervical lymphadenopathy, no supraclavicular fat pad  THYROID: thyroid palpable, difficult to palpate because of body habitus, no distinct nodules palpated, non tender on my exam today  CARDIOVASCULAR: S1, S2 heard  RESPIRATORY: Symmetrical chest expansion, normal respiratory effort, clear to auscultation bilaterally  GASTROINTESTINAL: Soft, non tender, wide striae no  MUSCULOSKELETAL: no cyanosis, normal muscle mass, no edema in lower extremities  SKIN: Warm, dry, no lesions  NEUROLOGIC: Awake, alert, DTR normal, tremors no    Outside records and notes: reviewed. Pertinent positives " summarized in HPI    LABS:   3/11/22 - TSH 2.43, free T4 0.56L, prolactin 10.7, FSH 4.7, LH 5, A1c 6.4, estradiol 127, progesterone 0.2, beta-hCG <1, total testosterone 7, free testosterone 0.7 normal,    10/21 -calcium 9.3, A1c 6.3, free T4 0.54, TSH 4, thyroglobulin antibody<1      No results found for: TSH, TSHFT4, O5QQGVS, A3PJSGV, T3FREE, FREET4  No results found for: THYROIDAB, TSI  No results found for: ALT, WBC     Chemistry    No results found for: NA, K, CL, CO2, BUN, CREATININE, GLU No results found for: CALCIUM, ALKPHOS, AST, ALT, BILITOT          No results found for: TSH, U5FDGTN, W6AFEJT, THYROIDAB  No results found for: WBC, HGB, HCT, MCV, PLT  No results found for: ALT, AST, GGT, ALKPHOS, BILITOT    IMAGING:     PATHOLOGY:    ASSESSMENT AND PLAN:  This is a 42 y.o. female here for consultation.    1.  Abnormal thyroid test/secondary ammenorrhea  TSH normal, low free T4 DD - lab variation, binding tissue, interfering substance, central hypothyroidism    Will exclude hypothyroidism, which could contribute to menstrual irregularity  She denied taking any supplements including biotin  Advised to repeat thyroid labs, check TSH, free T4, free T4 by equilibrium dialysis  Check antibodies for Hashimoto's  If labs indicate of central hypothyroidism, will recommend MRI pituitary  Prolactin, total testosterone normal.  March labs fall in follicular/ovulatory phase. Obtain records from her GYN  Advised to follow-up with GYN for HRT    2.  Prediabetes/morbid obesity  She will benefit from bariatric surgery  [facility]  We discussed metformin to help regulate cycles and prediabetes.  High risk for lactic acidosis secondary to history of chronic hypoxia needing nocturnal oxygen  Recheck A1c, BMP. consider GLP to aid with weight loss    3. Nicotine dependence, cigarettes, uncomplicated    Use of tobacco was reviewed.  I advised to quit and we also spoke about the impact of  smoking.  I did assess their willingness to attempt to quit.  I provided methods for cessation.   I encouraged  to set quit date.  Amount of time counseling patient:  5 minutes.      I have answered all of my patient's questions to the best of my ability. To call us with any changes    Return to office in 3 months or earlier if issues arise     Orders Placed This Encounter   Procedures   • TSH 3rd Generation   • T4, free   • free T4 equilibrium dialysis - Miscellaneous Test   • TSH 3rd Generation   • T4, free   • Thyroid peroxidase antibody   • free T4 equilibrium dialysis - Miscellaneous Test   • Hemoglobin A1c   • Basic metabolic panel   • Basic metabolic panel   • Hemoglobin A1c   • BhCG, Serum, Quant   • Ambulatory referral to Nutrition Services       This note was sent to PCP    This patient has been dictated using speech recognition software. Inadvertent speech recognition errors should be disregarded. Please do not hesitate to call the office for clarification.

## 2022-05-20 PROBLEM — Z71.6 ENCOUNTER FOR SMOKING CESSATION COUNSELING: Status: ACTIVE | Noted: 2022-05-20

## 2022-05-20 NOTE — TELEPHONE ENCOUNTER
Pt Requesting all Lab orders changed over to [facility] so that she can et er labs drawn next week .     Callback # [phone]

## 2022-06-16 ENCOUNTER — TELEPHONE (OUTPATIENT)
Dept: ENDOCRINOLOGY | Facility: CLINIC | Age: 42
End: 2022-06-16
Payer: COMMERCIAL

## 2022-06-16 DIAGNOSIS — R79.89 ABNORMAL THYROID BLOOD TEST: Primary | ICD-10-CM

## 2022-06-29 LAB — HBA1C MFR BLD: 6.6 % (ref 4.8–5.6)

## 2022-06-30 ENCOUNTER — TELEPHONE (OUTPATIENT)
Dept: ENDOCRINOLOGY | Facility: CLINIC | Age: 42
End: 2022-06-30
Payer: COMMERCIAL

## 2022-06-30 DIAGNOSIS — R79.89 ABNORMAL THYROID BLOOD TEST: Primary | ICD-10-CM

## 2022-06-30 DIAGNOSIS — N91.1 SECONDARY AMENORRHEA: ICD-10-CM

## 2022-06-30 LAB
BUN SERPL-MCNC: 11 MG/DL (ref 6–24)
BUN/CREAT SERPL: 15 (ref 9–23)
CALCIUM SERPL-MCNC: 9.1 MG/DL (ref 8.7–10.2)
CHLORIDE SERPL-SCNC: 101 MMOL/L (ref 96–106)
CO2 SERPL-SCNC: 24 MMOL/L (ref 20–29)
CREAT SERPL-MCNC: 0.75 MG/DL (ref 0.57–1)
EGFRCR SERPLBLD CKD-EPI 2021: 103 ML/MIN/1.73
GLUCOSE SERPL-MCNC: 122 MG/DL (ref 65–99)
HCG INTACT+B SERPL-ACNC: <1 MIU/ML
POTASSIUM SERPL-SCNC: 4.4 MMOL/L (ref 3.5–5.2)
SODIUM SERPL-SCNC: 140 MMOL/L (ref 134–144)
T4 FREE SERPL-MCNC: 0.65 NG/DL (ref 0.82–1.77)
THYROPEROXIDASE AB SERPL-ACNC: 12 IU/ML (ref 0–34)
TSH SERPL DL<=0.005 MIU/L-ACNC: 2.75 UIU/ML (ref 0.45–4.5)

## 2022-07-07 LAB — T4 FREE SERPL DIALY-MCNC: 0.67 NG/DL

## 2022-07-13 NOTE — TELEPHONE ENCOUNTER
.  
Call patient reviewed labs, A1c 6.6, TSH 2.75, free T4 (direct) 0.65 low.  Reviewed several reasons for isolated low free T4. She expressed cannot repeat free T4 at any other lab secondary to her insurance and financial situation.  Prefers to pursue with MRI pituitary.  We discussed thyroid replacement after MRI.     I have answered all of my patient's questions to the best of my ability. To call us with any changes.        Please do prior authorization for MRI pituitary        
Called [facility] and was on hold for 10 minutes while they searched for the normal range and Joaquina from [facility] stated they will call me back with the normal range for the test Free T4 by Dialysis/Mass Spec on 7/7/22 @ 3:34 PM  
Called Patient and let her know  that the Free T4 dialysis/mass speck is still running so we will hold off on the MRI and if she doesn't hear from us in 1 week to give the office a call.  
Called and left message for patient about her low free T4 dialysis/mass spec results and th proceed with the MRI of her pituitary to rule out adrenal insufficiencies and to have an 8 am ACTH and cortisol labs done. Labs were sent electronically.  
Called [facility] back and was told to call [facility] because they performed the test. Called [facility] and they informed me 0.8 - 1.7 is the range for this test.  
Caller called to advise if blood work came back yet   
It was approved.     Auth: [id number]    From: 07/12/22 to 09/10/22    I informed patient about. She will schedule test in August.   
Please call [facility] and find out the normal range for below test     Free T4 by Dialysis/Mass Spec ng/dL 0.67        
Please call [facility] and make sure Free T4 direct dialysis is running, if not done please add free T4 dialysis/mass speck.  Lab order placed electronically.  
Radha,  Please let the patient know free T4 dialysis/mass spect resulted low 0.65 (0.8-1.7), to proceed with MRI pituitary.  R/o adrenal insufficiency. To have 8 AM ACTH, cortisol.  Lab order placed electronically.      Rena,  Submit prior Auth for MRI pituitary  
The Auth status: in review     As soon as It gets approved, office will contact the patient.       
none

## 2022-07-15 ENCOUNTER — TELEPHONE (OUTPATIENT)
Dept: ENDOCRINOLOGY | Facility: CLINIC | Age: 42
End: 2022-07-15

## 2022-07-15 ENCOUNTER — TELEPHONE (OUTPATIENT)
Dept: ENDOCRINOLOGY | Facility: CLINIC | Age: 42
End: 2022-07-15
Payer: COMMERCIAL

## 2022-07-15 NOTE — TELEPHONE ENCOUNTER
Radha,    Please review with patient she needs to have labs 8 AM fasting ACTH and cortisol to rule out adrenal insufficiency (low cortisol). Also proceed with MRI of pituitary since free T4 by equilibrium dialysis returned low.

## 2022-07-15 NOTE — TELEPHONE ENCOUNTER
Talked to patient.  She was confused if she has to go to [facility] for labs and insurance coverage issues.  Reviewed she can perform ACTH and cortisol test at any lab.  She currently has no transportation and will be getting her car fixed beginning of August.  Her MRI scheduled August 8.  Advised to have labs as soon as she can, if adequate cortisol, start thyroid replacement.  Patient voiced understanding.    I have answered all of my patient's questions to the best of my ability. To call us with any changes.

## 2022-07-15 NOTE — TELEPHONE ENCOUNTER
Called patient to have 8 AM fasting ACTH and cortisol, no answer, left a voice message requesting a call back.    If patient calls back please let her know to have these tests.

## 2022-07-15 NOTE — TELEPHONE ENCOUNTER
Called patient to let her know to get the 8 AM fasting ACTH and cortisol test done and patient informed me that she has no car right now so she will not be able to go get any labs done until the beginning of August when she gets her car fixed. Patient talked to Dr. Anderson who was fine with that as long as she gets them done as soon as she can. Then patient is to get a MRI of her pituitary since free T4 by equilibrium dialysis returned low. Patient is in agreement with all of those instructions.

## 2022-07-15 NOTE — TELEPHONE ENCOUNTER
Patient returned Dr Anderson's call. She is confused as to if she should have labs done or an MRI. She states she received another call stating that she needed an MRI but the call today states she needs to have labs completed. She would like a call back to be advised

## 2022-08-11 ENCOUNTER — TELEPHONE (OUTPATIENT)
Dept: ENDOCRINOLOGY | Facility: CLINIC | Age: 42
End: 2022-08-11

## 2022-08-11 NOTE — TELEPHONE ENCOUNTER
Patient is unsure when she will be able to get her labs and MRI done due to transportation. She thinks she may be able to get it done by the beginning of next month. She wanted to inform   
Patient is unsure when she will be able to get her labs and MRI done due to transportation. She thinks she may be able to get it done by the beginning of next month. She wanted to inform   
no

## 2022-08-12 ENCOUNTER — TELEPHONE (OUTPATIENT)
Dept: ENDOCRINOLOGY | Facility: CLINIC | Age: 42
End: 2022-08-12
Payer: COMMERCIAL

## 2022-08-12 DIAGNOSIS — R79.89 ABNORMAL THYROID BLOOD TEST: Primary | ICD-10-CM

## 2022-08-12 NOTE — TELEPHONE ENCOUNTER
Patient called in stating she has just gotten her labs done and she needs her medication. She is aware she needs an MRI but she would like to speak with the nurse. Please reach out to the patient when available.

## 2022-08-13 LAB
ACTH PLAS-MCNC: 48.2 PG/ML (ref 7.2–63.3)
CORTIS SERPL-MCNC: 10.1 UG/DL

## 2022-08-15 NOTE — TELEPHONE ENCOUNTER
Patient called back to tell me she got her blood work done and wanted to be put on medication for her thyroid. I informed patient that Dr. Anderson is out of the country on [place] right now and will discuss this with her at her appointment on 9/13/22 unless she reads the results ahead of time then I will give her a call back and let her know.

## 2022-08-18 RX ORDER — LEVOTHYROXINE SODIUM 75 UG/1
75 TABLET ORAL DAILY
Qty: 90 TABLET | Refills: 3 | Status: SHIPPED | OUTPATIENT
Start: 2022-08-18 | End: 2022-12-14

## 2022-08-18 NOTE — TELEPHONE ENCOUNTER
Let her know her free T4 is low, recommend starting levothyroxine 75 mcg daily.  Repeat thyroid tests few days prior to next visit.  Please remind her to have MRI before her visit.  Lab orders placed electronically.  Prescription sent to the pharmacy.

## 2022-08-19 NOTE — TELEPHONE ENCOUNTER
Called patient and left a message about the results of his lab work and  new medication to start ( Levothyroxine 75 mcg daily) and to get labs done and MRI done before next visit.

## 2022-09-06 ENCOUNTER — TELEPHONE (OUTPATIENT)
Dept: ENDOCRINOLOGY | Facility: CLINIC | Age: 42
End: 2022-09-06

## 2022-09-06 NOTE — TELEPHONE ENCOUNTER
Patient's MRI is good from 6/2022 to 6/2023. Patient wants new script sent to her home, I will send that out today.

## 2022-09-06 NOTE — TELEPHONE ENCOUNTER
Kleber called states MRI script expires 09/12/2022 and she wont be able to get it by then can we put in another script

## 2022-12-05 LAB
HBA1C MFR BLD: 5.7 % (ref 4.8–5.6)
HBA1C MFR BLD: 5.8 % (ref 4.8–5.6)

## 2022-12-06 ENCOUNTER — TELEPHONE (OUTPATIENT)
Dept: ENDOCRINOLOGY | Facility: CLINIC | Age: 42
End: 2022-12-06
Payer: COMMERCIAL

## 2022-12-06 LAB
BUN SERPL-MCNC: 6 MG/DL (ref 6–24)
BUN/CREAT SERPL: 10 (ref 9–23)
CALCIUM SERPL-MCNC: 8.9 MG/DL (ref 8.7–10.2)
CHLORIDE SERPL-SCNC: 101 MMOL/L (ref 96–106)
CO2 SERPL-SCNC: 24 MMOL/L (ref 20–29)
CREAT SERPL-MCNC: 0.58 MG/DL (ref 0.57–1)
EGFRCR SERPLBLD CKD-EPI 2021: 117 ML/MIN/1.73
GLUCOSE SERPL-MCNC: 90 MG/DL (ref 70–99)
POTASSIUM SERPL-SCNC: 4 MMOL/L (ref 3.5–5.2)
SODIUM SERPL-SCNC: 137 MMOL/L (ref 134–144)
T4 FREE SERPL-MCNC: 0.74 NG/DL (ref 0.82–1.77)
T4 FREE SERPL-MCNC: 0.75 NG/DL (ref 0.82–1.77)
THYROPEROXIDASE AB SERPL-ACNC: 13 IU/ML (ref 0–34)
TSH SERPL DL<=0.005 MIU/L-ACNC: 2.5 UIU/ML (ref 0.45–4.5)
TSH SERPL DL<=0.005 MIU/L-ACNC: 2.67 UIU/ML (ref 0.45–4.5)

## 2022-12-06 NOTE — TELEPHONE ENCOUNTER
Please call lab and make sure Free T4 equilibrium dialysis/mass SPECT is running, if not please add on.

## 2022-12-09 NOTE — TELEPHONE ENCOUNTER
I spoke with pt  - she is aware that we will have to initiate a new authorization since the previous one . She would like to have the MRI performed at [facility].    I will contact pt once I receive a response from the insurance company

## 2022-12-12 LAB — T4 FREE SERPL DIALY-MCNC: 0.76 NG/DL

## 2022-12-14 ENCOUNTER — OFFICE VISIT (OUTPATIENT)
Dept: ENDOCRINOLOGY | Facility: CLINIC | Age: 42
End: 2022-12-14
Payer: COMMERCIAL

## 2022-12-14 VITALS
HEART RATE: 89 BPM | HEIGHT: 63 IN | OXYGEN SATURATION: 97 % | WEIGHT: 293 LBS | BODY MASS INDEX: 51.91 KG/M2 | TEMPERATURE: 97.7 F | DIASTOLIC BLOOD PRESSURE: 70 MMHG | SYSTOLIC BLOOD PRESSURE: 124 MMHG

## 2022-12-14 DIAGNOSIS — E66.01 MORBID OBESITY (CMS/HCC): ICD-10-CM

## 2022-12-14 DIAGNOSIS — R73.03 PREDIABETES: ICD-10-CM

## 2022-12-14 DIAGNOSIS — E03.8 CENTRAL HYPOTHYROIDISM: Primary | ICD-10-CM

## 2022-12-14 DIAGNOSIS — N91.1 SECONDARY AMENORRHEA: ICD-10-CM

## 2022-12-14 PROCEDURE — 3008F BODY MASS INDEX DOCD: CPT | Performed by: INTERNAL MEDICINE

## 2022-12-14 PROCEDURE — 99214 OFFICE O/P EST MOD 30 MIN: CPT | Performed by: INTERNAL MEDICINE

## 2022-12-14 RX ORDER — PREDNISONE 10 MG/1
TABLET ORAL
COMMUNITY
Start: 2022-08-28 | End: 2023-03-15

## 2022-12-14 RX ORDER — LOPERAMIDE HYDROCHLORIDE 2 MG/1
CAPSULE ORAL
COMMUNITY
Start: 2022-11-11 | End: 2023-03-15

## 2022-12-14 RX ORDER — CYCLOBENZAPRINE HCL 10 MG
TABLET ORAL
COMMUNITY
Start: 2022-12-08 | End: 2023-03-15

## 2022-12-14 RX ORDER — LEVOTHYROXINE SODIUM 100 UG/1
100 TABLET ORAL DAILY
Qty: 90 TABLET | Refills: 3 | Status: SHIPPED | OUTPATIENT
Start: 2022-12-14 | End: 2023-12-04

## 2022-12-14 RX ORDER — OMEPRAZOLE 40 MG/1
CAPSULE, DELAYED RELEASE ORAL
COMMUNITY
Start: 2022-11-20

## 2022-12-14 RX ORDER — DICYCLOMINE HYDROCHLORIDE 10 MG/1
CAPSULE ORAL
COMMUNITY
Start: 2022-11-23 | End: 2023-03-15

## 2022-12-14 RX ORDER — MECLIZINE HCL 25MG 25 MG/1
TABLET, CHEWABLE ORAL
COMMUNITY
Start: 2022-11-07 | End: 2023-03-15

## 2022-12-14 RX ORDER — DULAGLUTIDE 1.5 MG/.5ML
INJECTION, SOLUTION SUBCUTANEOUS
COMMUNITY
Start: 2022-11-27 | End: 2023-08-01

## 2022-12-14 NOTE — TELEPHONE ENCOUNTER
"I received a notification from pt's insurance company stating pt's MRI was denied for the following reason:     \"Your request was denied completely because:  We reviewed the medical information given by your doctor. Your doctors records say you have a hormone problem. With what was received from your doctor, a decision was made that a hormone problem is not a reason for a(n) Brain MRI. To approve, we need doctor's notes that say this test is needed to help treat your   problem. It is our medical view that the Brain MRI be denied as it is not medically necessary. [employer] Clinical Guideline 001 for Brain (head) MRI was used in this request.\"        Pt has a follow up scheduled for today  "

## 2022-12-14 NOTE — TELEPHONE ENCOUNTER
"I received a notification from pt's insurance company stating pt's MRI was denied for the following reason:    \"Your request was denied completely because:  We reviewed the medical information given by your doctor. Your doctor’s records say you have a hormone problem. With what was received from your doctor, a decision was made that a hormone problem is not a reason for a(n) Brain MRI. To approve, we need doctor's notes that say this test is needed to help treat your   problem. It is our medical view that the Brain MRI be denied as it is not medically necessary. [employer] Clinical Guideline 001 for Brain (head) MRI was used in this request.\"      Pt has a follow up scheduled for today    "

## 2022-12-14 NOTE — PROGRESS NOTES
Tiffanie Waters is a 42 y.o. female who presents today for evaluation and management of hormone dysfunction. Referred by Rosey Velasco DO.     Patient was accompanied by her fiancé who provided additional history  poor historian    Review of labs noted abnormal low free T4 dating back to oct 21    12/14/22  Patient was accompanied by her close friend Fernanda Kapoor, she noted her  health care aid and want her medical information shared with her if needed  Aug 2022 -acute respiratory failure from co2 intoxication from improer use of BiPAP.  She was admitted at [facility] for 4 days and therefore could not obtain MRI pituitary  September started levothyroxine 75 mcg daily after labs indicated free T4 low and morning cortisol adequate  Taking levothyroxine in the evening along with other medications  She had 1 menstrual cycle in September.  Seen GYN and was given hormone replacement , she could not recall the name.  She opted not to continue    History  Feb 2021 she had secondary Amenorrhea,  Hormone pill resumed cycles. She followed Dr. Avril Sanders at [facility].  Feb 2022 she had no cycle and march 1 day having bleeding for 1 day and no cycle since then.  She had blood work in March and did not follow-up with GYN as she did not want hormonal pills to resume cycles    H.o of severe morbid obesity, JANICE on BiPAP followed by Pulmonology, anxiety, depression, asthma, mariajuana use, tobacco use, LLE DVT on Eliquis, b/l knee OA, HDS, chronic hypoxemia on nocturnal oxygen, low progesterone, prediabetes, ambulatory dysfunction    Menarche: 13  Menstrual cycles: reg,  2014 uterine polyp irreg and resolved. Feb 2021 - had secondary amenorrhea and Hormone pill resumed cycles.  Recurrence of ammenorrhea since feb 2022    LMP above  Pregnancy no,  plans yes    H/o acne: from CPAP  H/o Hirsutism: no    Weight gain: heavy since childhood, lost 40 # and gained back  Cold intolerance no, more heat, constipation  no    H/o  Fractures/Osteoporosis: as adult no  Wide abdominal Striae: no  H/o Proximal muscle weakness: no  H/o Chronic /Recent Steroid Use no  H/o HTN no  H/o DM predm  Excessive skin fragility no    Galactorrhea no  Headaches ns  Tunnel vision no  Change in shoe size no  Insomnia JANICE, BIPAP.         Past Medical History:   Diagnosis Date   • Absence of menstruation      Past Surgical History:   Procedure Laterality Date   • CERVICAL POLYPECTOMY     • WISDOM TOOTH EXTRACTION       Family History   Problem Relation Age of Onset   • Asthma Biological Mother    • Hyperlipidemia Biological Mother    • Hypertension Biological Mother    • Thyroid disease Biological Mother    • Diabetes Biological Mother    • Clotting disorder Biological Mother    • Kidney disease Biological Father    • Hyperlipidemia Biological Father    • Hypertension Biological Father    • Diabetes Biological Father    • Heart failure Biological Sister    • Asthma Biological Sister    • Hypertension Biological Sister    • Cancer Biological Sister    • Clotting disorder Biological Sister    • Cervical cancer Biological Sister    • Clotting disorder Maternal Grandmother    • Asthma Maternal Grandmother    • Uterine cancer Maternal Grandmother    • Kidney disease Maternal Grandfather    • Colon cancer Maternal Grandfather    • Lung cancer Maternal Grandfather    • Heart disease Paternal Grandmother    • Hip dysplasia Paternal Grandmother    • Stroke Paternal Grandfather    • Prostate cancer Paternal Grandfather      Current Outpatient Medications   Medication Sig Dispense Refill   • albuterol HFA (VENTOLIN HFA) 90 mcg/actuation inhaler INHALE 2 PUFFS BY MOUTH EVERY 4- 6 HOURS AS NEEDED     • apixaban (ELIQUIS) 5 mg tablet TAKE 1 TABLET(5 MG) BY MOUTH TWICE DAILY     • busPIRone (BUSPAR) 15 mg tablet Take 15 mg by mouth 2 (two) times a day.     • doxepin (SINEquan) 10 mg capsule Take 1 mg by mouth nightly.     • levothyroxine (SYNTHROID) 100 mcg tablet Take 1 tablet  (100 mcg total) by mouth daily. 90 tablet 3   • omeprazole (PriLOSEC) 40 mg capsule take 1 capsule by mouth IN THE MORNING and 1 capsule at bedtime     • QUEtiapine (SEROquel) 400 mg tablet Take 400 mg by mouth nightly. 400 mg at night , 50 in am, 50 in aftrnoon     • rosuvastatin (CRESTOR) 5 mg tablet      • topiramate (TOPAMAX) 100 mg tablet Take 100 mg by mouth daily.     • TRULICITY 1.5 mg/0.5 mL pen injector INJECT 0.5ML INTO THE SKIN EVERY 7 DAYS     • venlafaxine XR (EFFEXOR-XR) 150 mg 24 hr capsule Take 150 mg by mouth every morning.     • clobetasoL (TEMOVATE) 0.05 % ointment APPLY A THIN LAYER TO THE AFFECTED AREA TWICE DAILY FOR ITCHING     • cyclobenzaprine (FLEXERIL) 10 mg tablet take 1 tablet by mouth twice a day if needed for muscle spasm     • diclofenac sodium (VOLTAREN) 1 % topical gel Apply topically.     • dicyclomine (BENTYL) 10 mg capsule take 1 capsule by mouth four times a day before meals and at bedtime     • fluticasone propion-salmeteroL (ADVAIR HFA) 115-21 mcg/actuation inhaler INHALE 2 PUFFS INTO THE LUNGS TWICE DAILY     • furosemide (LASIX) 40 mg tablet Take 40 mg by mouth daily as needed.     • gabapentin (NEURONTIN) 100 mg capsule Take 100 mg by mouth 3 (three) times a day.     • gabapentin (NEURONTIN) 400 mg capsule Take 400 mg by mouth 3 (three) times a day.     • loperamide (IMODIUM) 2 mg capsule take 1 capsule by mouth four times a day if needed for diarrhea     • meclizine (ANTIVERT) 25 mg tablet chew and swallow 1 tablet by mouth three times a day if needed     • metFORMIN (GLUCOPHAGE) 500 mg tablet      • nicotine (NICODERM CQ) 14 mg/24 hr APPLY 1 PATCH TOPICALLY TO THE SKIN DAILY     • omeprazole (PriLOSEC) 20 mg capsule Take 20 mg by mouth daily as needed.     • predniSONE (DELTASONE) 10 mg tablet STARTING 8/29, TAKE 6TAB DAILY FOR 2DAYS, 4TAB DAILY FOR 2DAYS, 3...  (REFER TO PRESCRIPTION NOTES).     • QUEtiapine (SEROquel) 50 mg tablet TAKE 1 TABLET BY MOUTH EVERY MORNING  "AND 1 TABLET IN THE AFTERNOON     • SSD 1 % cream APPLY A THIN LAYER TO STOMACH AREA DAILY       No current facility-administered medications for this visit.     No Known Allergies  Social History     Socioeconomic History   • Marital status: Unknown     Spouse name: None   • Number of children: None   • Years of education: None   • Highest education level: None   Tobacco Use   • Smoking status: Every Day     Packs/day: 0.50     Types: Cigarettes   • Smokeless tobacco: Never   Substance and Sexual Activity   • Alcohol use: Never   • Drug use: Yes     Types: Marijuana   • Sexual activity: Defer       ROS:  The complete review of systems is otherwise negative except as noted in HPI.      PHYSICAL EXAM:  Vitals:    12/14/22 1146   BP: 124/70   BP Location: Right upper arm   Patient Position: Sitting   Pulse: 89   Temp: 36.5 °C (97.7 °F)   TempSrc: Temporal   SpO2: 97%   Weight: (!) 171 kg (377 lb 9.6 oz)   Height: 1.6 m (5' 3\")       Body mass index is 66.89 kg/m².    GENERAL: Well developed, well nourished, in no acute distress, morbidly obese in wheelchair  EYES: Extraocular movements intact, conjunctiva no chemosis, no proptosis, no lid lag, retraction  NECK: Supple, nl anterior cervical lymphadenopathy  THYROID: thyroid palpable, difficult to palpate because of body habitus, no distinct nodules palpated, non tender on my exam today  CARDIOVASCULAR: S1, S2 heard  RESPIRATORY: Symmetrical chest expansion, normal respiratory effort,  GASTROINTESTINAL: Soft, non tender, wide striae no  MUSCULOSKELETAL: no cyanosis, normal muscle mass, no edema in lower extremities  SKIN: Warm, dry, no lesions  NEUROLOGIC: Awake, alert, DTR normal, tremors no    Outside records and notes: reviewed. Pertinent positives summarized in HPI    LABS:   3/11/22 - TSH 2.43, free T4 0.56L, prolactin 10.7, FSH 4.7, LH 5, A1c 6.4, estradiol 127, progesterone 0.2, beta-hCG <1, total testosterone 7, free testosterone 0.7 normal,    10/21 -calcium " 9.3, A1c 6.3, free T4 0.54, TSH 4, thyroglobulin antibody<1   Latest Reference Range & Units 08/12/22 10:23   ACTH, Plasma 7.2 - 63.3 pg/mL 48.2   Cortisol ug/dL 10.1      Latest Reference Range & Units 12/05/22 10:41 12/05/22 10:43   Hemoglobin A1C 4.8 - 5.6 % 5.8 (H) 5.7 (H)   (H): Data is abnormally high   Latest Reference Range & Units 12/05/22 10:41 12/05/22 10:44   TSH 0.450 - 4.500 uIU/mL 2.670 2.500   Free T4 0.82 - 1.77 ng/dL 0.75 (L) 0.74 (L)   Calcium 8.7 - 10.2 mg/dL 8.9    Free T4 by Dialysis/Mass Spec ng/dL  0.76   (L): Data is abnormally low    Lab Results   Component Value Date    TSH 2.500 12/05/2022    TSH 2.670 12/05/2022    TSH 2.750 06/29/2022     No results found for: THYROIDAB, TSI  No results found for: ALT, WBC     Chemistry        Component Value Date/Time     12/05/2022 1041    K 4.0 12/05/2022 1041     12/05/2022 1041    CO2 24 12/05/2022 1041    BUN 6 12/05/2022 1041    CREATININE 0.58 12/05/2022 1041    No results found for: CALCIUM, ALKPHOS, AST, ALT, BILITOT      IMAGING:     PATHOLOGY:    ASSESSMENT AND PLAN:  This is a 42 y.o. female here for consultation.    1.  Central hypothyroidism/secondary ammenorrhea  TSH normal, chr low free T4, including free T4 equilibrium dialysis    TSH mid normal, free T4 low  Increase levothyroxine 200 mcg daily  Reviewed the correct way to take thyroid medication is on empty stomach and wait at least 30 -60 minutes before eating or taking other medications. Avoid taking any calcium, iron, or vitamin supplements for 3 hours after taking thyroid hormone.     Advised to repeat thyroid labs in 4 weeks TSH, free T4, free T4 by equilibrium dialysis  Stressed MRI of the pituitary to rule out pituitary tumor.  Based on her weight she probably would need an open MRI  Prolactin, total testosterone, morning cortisol normal.  March labs fall in follicular/ovulatory phase.   Advised to follow-up with GYN for HRT    2.  Prediabetes/morbid  obesity  Started on Trulicity by her primary care physician  She declined bariatric surgery  Did not follow-up with [facility]        I have answered all of my patient's questions to the best of my ability. To call us with any changes    Return to office in 3 months or earlier if issues arise     Orders Placed This Encounter   Procedures   • TSH 3rd Generation   • T4, free       This note was sent to PCP    This patient has been dictated using speech recognition software. Inadvertent speech recognition errors should be disregarded. Please do not hesitate to call the office for clarification.

## 2022-12-14 NOTE — TELEPHONE ENCOUNTER
Please submit prior authorization for MRI pituitary.  Indication central hypothyroidism and amenorrhea  Please new MRI pituitary order at the visit    Please check with [facility], the weight limit for MRI she might need open MRI.

## 2022-12-14 NOTE — LETTER
December 14, 2022     Rosey Velasco DO  [address] [facility] [address]    Patient: Tiffanie Waters  YOB: 1980  Date of Visit: 12/14/2022      Dear Dr. Velasco:    Thank you for referring Tiffanie Waters to me for evaluation. Below are my notes for this consultation.    If you have questions, please do not hesitate to call me. I look forward to following your patient along with you.         Sincerely,        Maco Anderson MD        CC: No Recipients  Maco Anderson MD  12/14/2022  1:34 PM  Signed  Tiffanie Waters is a 42 y.o. female who presents today for evaluation and management of hormone dysfunction. Referred by Rosey Velasco DO.     Patient was accompanied by her fiancé who provided additional history  poor historian    Review of labs noted abnormal low free T4 dating back to oct 21    12/14/22  Patient was accompanied by her close friend Fernanda Kapoor, she noted her  health care aid and want her medical information shared with her if needed  Aug 2022 -acute respiratory failure from co2 intoxication from improer use of BiPAP.  She was admitted at [facility] for 4 days and therefore could not obtain MRI pituitary  September started levothyroxine 75 mcg daily after labs indicated free T4 low and morning cortisol adequate  Taking levothyroxine in the evening along with other medications  She had 1 menstrual cycle in September.  Seen GYN and was given hormone replacement , she could not recall the name.  She opted not to continue    History  Feb 2021 she had secondary Amenorrhea,  Hormone pill resumed cycles. She followed Dr. Avril Sanders at [facility].  Feb 2022 she had no cycle and march 1 day having bleeding for 1 day and no cycle since then.  She had blood work in March and did not follow-up with GYN as she did not want hormonal pills to resume cycles    H.o of severe morbid obesity, JANICE on BiPAP followed by Pulmonology, anxiety, depression, asthma, mariajuana use, tobacco  use, LLE DVT on Eliquis, b/l knee OA, HDS, chronic hypoxemia on nocturnal oxygen, low progesterone, prediabetes, ambulatory dysfunction    Menarche: 13  Menstrual cycles: reg,  2014 uterine polyp irreg and resolved. Feb 2021 - had secondary amenorrhea and Hormone pill resumed cycles.  Recurrence of ammenorrhea since feb 2022    LMP above  Pregnancy no,  plans yes    H/o acne: from CPAP  H/o Hirsutism: no    Weight gain: heavy since childhood, lost 40 # and gained back  Cold intolerance no, more heat, constipation  no    H/o Fractures/Osteoporosis: as adult no  Wide abdominal Striae: no  H/o Proximal muscle weakness: no  H/o Chronic /Recent Steroid Use no  H/o HTN no  H/o DM predm  Excessive skin fragility no    Galactorrhea no  Headaches ns  Tunnel vision no  Change in shoe size no  Insomnia JANICE, BIPAP.         Past Medical History:   Diagnosis Date   • Absence of menstruation      Past Surgical History:   Procedure Laterality Date   • CERVICAL POLYPECTOMY     • WISDOM TOOTH EXTRACTION       Family History   Problem Relation Age of Onset   • Asthma Biological Mother    • Hyperlipidemia Biological Mother    • Hypertension Biological Mother    • Thyroid disease Biological Mother    • Diabetes Biological Mother    • Clotting disorder Biological Mother    • Kidney disease Biological Father    • Hyperlipidemia Biological Father    • Hypertension Biological Father    • Diabetes Biological Father    • Heart failure Biological Sister    • Asthma Biological Sister    • Hypertension Biological Sister    • Cancer Biological Sister    • Clotting disorder Biological Sister    • Cervical cancer Biological Sister    • Clotting disorder Maternal Grandmother    • Asthma Maternal Grandmother    • Uterine cancer Maternal Grandmother    • Kidney disease Maternal Grandfather    • Colon cancer Maternal Grandfather    • Lung cancer Maternal Grandfather    • Heart disease Paternal Grandmother    • Hip dysplasia Paternal Grandmother    •  Stroke Paternal Grandfather    • Prostate cancer Paternal Grandfather      Current Outpatient Medications   Medication Sig Dispense Refill   • albuterol HFA (VENTOLIN HFA) 90 mcg/actuation inhaler INHALE 2 PUFFS BY MOUTH EVERY 4- 6 HOURS AS NEEDED     • apixaban (ELIQUIS) 5 mg tablet TAKE 1 TABLET(5 MG) BY MOUTH TWICE DAILY     • busPIRone (BUSPAR) 15 mg tablet Take 15 mg by mouth 2 (two) times a day.     • doxepin (SINEquan) 10 mg capsule Take 1 mg by mouth nightly.     • levothyroxine (SYNTHROID) 100 mcg tablet Take 1 tablet (100 mcg total) by mouth daily. 90 tablet 3   • omeprazole (PriLOSEC) 40 mg capsule take 1 capsule by mouth IN THE MORNING and 1 capsule at bedtime     • QUEtiapine (SEROquel) 400 mg tablet Take 400 mg by mouth nightly. 400 mg at night , 50 in am, 50 in aftrnoon     • rosuvastatin (CRESTOR) 5 mg tablet      • topiramate (TOPAMAX) 100 mg tablet Take 100 mg by mouth daily.     • TRULICITY 1.5 mg/0.5 mL pen injector INJECT 0.5ML INTO THE SKIN EVERY 7 DAYS     • venlafaxine XR (EFFEXOR-XR) 150 mg 24 hr capsule Take 150 mg by mouth every morning.     • clobetasoL (TEMOVATE) 0.05 % ointment APPLY A THIN LAYER TO THE AFFECTED AREA TWICE DAILY FOR ITCHING     • cyclobenzaprine (FLEXERIL) 10 mg tablet take 1 tablet by mouth twice a day if needed for muscle spasm     • diclofenac sodium (VOLTAREN) 1 % topical gel Apply topically.     • dicyclomine (BENTYL) 10 mg capsule take 1 capsule by mouth four times a day before meals and at bedtime     • fluticasone propion-salmeteroL (ADVAIR HFA) 115-21 mcg/actuation inhaler INHALE 2 PUFFS INTO THE LUNGS TWICE DAILY     • furosemide (LASIX) 40 mg tablet Take 40 mg by mouth daily as needed.     • gabapentin (NEURONTIN) 100 mg capsule Take 100 mg by mouth 3 (three) times a day.     • gabapentin (NEURONTIN) 400 mg capsule Take 400 mg by mouth 3 (three) times a day.     • loperamide (IMODIUM) 2 mg capsule take 1 capsule by mouth four times a day if needed for  "diarrhea     • meclizine (ANTIVERT) 25 mg tablet chew and swallow 1 tablet by mouth three times a day if needed     • metFORMIN (GLUCOPHAGE) 500 mg tablet      • nicotine (NICODERM CQ) 14 mg/24 hr APPLY 1 PATCH TOPICALLY TO THE SKIN DAILY     • omeprazole (PriLOSEC) 20 mg capsule Take 20 mg by mouth daily as needed.     • predniSONE (DELTASONE) 10 mg tablet STARTING 8/29, TAKE 6TAB DAILY FOR 2DAYS, 4TAB DAILY FOR 2DAYS, 3...  (REFER TO PRESCRIPTION NOTES).     • QUEtiapine (SEROquel) 50 mg tablet TAKE 1 TABLET BY MOUTH EVERY MORNING AND 1 TABLET IN THE AFTERNOON     • SSD 1 % cream APPLY A THIN LAYER TO STOMACH AREA DAILY       No current facility-administered medications for this visit.     No Known Allergies  Social History     Socioeconomic History   • Marital status: Unknown     Spouse name: None   • Number of children: None   • Years of education: None   • Highest education level: None   Tobacco Use   • Smoking status: Every Day     Packs/day: 0.50     Types: Cigarettes   • Smokeless tobacco: Never   Substance and Sexual Activity   • Alcohol use: Never   • Drug use: Yes     Types: Marijuana   • Sexual activity: Defer       ROS:  The complete review of systems is otherwise negative except as noted in HPI.      PHYSICAL EXAM:  Vitals:    12/14/22 1146   BP: 124/70   BP Location: Right upper arm   Patient Position: Sitting   Pulse: 89   Temp: 36.5 °C (97.7 °F)   TempSrc: Temporal   SpO2: 97%   Weight: (!) 171 kg (377 lb 9.6 oz)   Height: 1.6 m (5' 3\")       Body mass index is 66.89 kg/m².    GENERAL: Well developed, well nourished, in no acute distress, morbidly obese in wheelchair  EYES: Extraocular movements intact, conjunctiva no chemosis, no proptosis, no lid lag, retraction  NECK: Supple, nl anterior cervical lymphadenopathy  THYROID: thyroid palpable, difficult to palpate because of body habitus, no distinct nodules palpated, non tender on my exam today  CARDIOVASCULAR: S1, S2 heard  RESPIRATORY: Symmetrical " chest expansion, normal respiratory effort,  GASTROINTESTINAL: Soft, non tender, wide striae no  MUSCULOSKELETAL: no cyanosis, normal muscle mass, no edema in lower extremities  SKIN: Warm, dry, no lesions  NEUROLOGIC: Awake, alert, DTR normal, tremors no    Outside records and notes: reviewed. Pertinent positives summarized in [facility] [place]    LABS:   3/11/22 - TSH 2.43, free T4 0.56L, prolactin 10.7, FSH 4.7, LH 5, A1c 6.4, estradiol 127, progesterone 0.2, beta-hCG <1, total testosterone 7, free testosterone 0.7 normal,    10/21 -calcium 9.3, A1c 6.3, free T4 0.54, TSH 4, thyroglobulin antibody<1   Latest Reference Range & Units 08/12/22 10:23   ACTH, Plasma 7.2 - 63.3 pg/mL 48.2   Cortisol ug/dL 10.1      Latest Reference Range & Units 12/05/22 10:41 12/05/22 10:43   Hemoglobin A1C 4.8 - 5.6 % 5.8 (H) 5.7 (H)   (H): Data is abnormally high   Latest Reference Range & Units 12/05/22 10:41 12/05/22 10:44   TSH 0.450 - 4.500 uIU/mL 2.670 2.500   Free T4 0.82 - 1.77 ng/dL 0.75 (L) 0.74 (L)   Calcium 8.7 - 10.2 mg/dL 8.9    Free T4 by Dialysis/Mass Spec ng/dL  0.76   (L): Data is abnormally low    Lab Results   Component Value Date    TSH 2.500 12/05/2022    TSH 2.670 12/05/2022    TSH 2.750 06/29/2022     No results found for: THYROIDAB, TSI  No results found for: ALT, WBC     Chemistry        Component Value Date/Time     12/05/2022 1041    K 4.0 12/05/2022 1041     12/05/2022 1041    CO2 24 12/05/2022 1041    BUN 6 12/05/2022 1041    CREATININE 0.58 12/05/2022 1041    No results found for: CALCIUM, ALKPHOS, AST, ALT, BILITOT      IMAGING:     PATHOLOGY:    ASSESSMENT AND PLAN:  This is a 42 y.o. female here for consultation.    1.  Central hypothyroidism/secondary ammenorrhea  TSH normal, chr low free T4, including free T4 equilibrium dialysis    TSH mid normal, free T4 low  Increase levothyroxine 200 mcg daily  Reviewed the correct way to take thyroid medication is on empty stomach and wait at least 30 -60  minutes before eating or taking other medications. Avoid taking any calcium, iron, or vitamin supplements for 3 hours after taking thyroid hormone.     Advised to repeat thyroid labs in 4 weeks TSH, free T4, free T4 by equilibrium dialysis  Stressed MRI of the pituitary to rule out pituitary tumor.  Based on her weight she probably would need an open MRI  Prolactin, total testosterone, morning cortisol normal.  March labs fall in follicular/ovulatory phase.   Advised to follow-up with GYN for HRT    2.  Prediabetes/morbid obesity  Started on Trulicity by her primary care physician  She declined bariatric surgery  Did not follow-up with [facility]        I have answered all of my patient's questions to the best of my ability. To call us with any changes    Return to office in 3 months or earlier if issues arise     Orders Placed This Encounter   Procedures   • TSH 3rd Generation   • T4, free       This note was sent to PCP    This patient has been dictated using speech recognition software. Inadvertent speech recognition errors should be disregarded. Please do not hesitate to call the office for clarification.

## 2022-12-20 ENCOUNTER — TELEPHONE (OUTPATIENT)
Dept: ENDOCRINOLOGY | Facility: CLINIC | Age: 42
End: 2022-12-20

## 2022-12-20 NOTE — TELEPHONE ENCOUNTER
Patient is requesting a call back.  Her insurance denied MRI records state it is a hormone problem and not a reason for Brain MRI,the letter she received states she needs notes stating it is needed to help her problem

## 2022-12-20 NOTE — TELEPHONE ENCOUNTER
Spoke with pt's insurance - they confirmed that the additional visit notes were received. A [id number] is recommended to proceed.    [phone]  Ref # [id number]    I can set up the peer review when you are available

## 2022-12-20 NOTE — TELEPHONE ENCOUNTER
Please send my latest office visit.  We are requesting specifically MRI of the pituitary not MRI brain and it is not just for a hormone problem, but indication - central hypothyroidism and, secondary amenorrhea.  MRI pituitary is to exclude a pituitary tumor

## 2023-01-09 ENCOUNTER — TELEPHONE (OUTPATIENT)
Dept: ENDOCRINOLOGY | Facility: CLINIC | Age: 43
End: 2023-01-09
Payer: COMMERCIAL

## 2023-01-09 NOTE — TELEPHONE ENCOUNTER
Insurance Referral Request   Patient PCP: Rosey Velasco DO  Insurance Carrier: N/A  Specialist Name: Mainline   Provider Specialty: Mri  Provider NPI: [id number]  Office Location: [address] [place], [place] [address] [address]  Reason for Visit or Diagnosis Code: N91.1, E 03.8  Appointment Date: 1/9 2pm     Referral needs an updated NPI for the outpatient MRI for this location.      Insurance Referral will be submitted to Insurance within 2 business days.

## 2023-01-10 ENCOUNTER — HOSPITAL ENCOUNTER (OUTPATIENT)
Dept: RADIOLOGY | Facility: HOSPITAL | Age: 43
Discharge: HOME | End: 2023-01-10
Attending: INTERNAL MEDICINE
Payer: COMMERCIAL

## 2023-01-10 VITALS — WEIGHT: 293 LBS | BODY MASS INDEX: 66.78 KG/M2

## 2023-01-10 DIAGNOSIS — N91.1 SECONDARY AMENORRHEA: ICD-10-CM

## 2023-01-10 DIAGNOSIS — E03.8 CENTRAL HYPOTHYROIDISM: ICD-10-CM

## 2023-01-10 RX ORDER — GADOBUTROL 604.72 MG/ML
14 INJECTION INTRAVENOUS ONCE
Status: COMPLETED | OUTPATIENT
Start: 2023-01-10 | End: 2023-01-10

## 2023-01-10 RX ADMIN — GADOBUTROL 14 ML: 604.72 INJECTION INTRAVENOUS at 14:21

## 2023-01-12 ENCOUNTER — TELEPHONE (OUTPATIENT)
Dept: ENDOCRINOLOGY | Facility: CLINIC | Age: 43
End: 2023-01-12
Payer: COMMERCIAL

## 2023-01-12 NOTE — TELEPHONE ENCOUNTER
----- Message from Maco Anderson MD sent at 1/11/2023  4:04 PM EST -----  Regarding: FW: Question regarding MRI PITUITARY W WO CONTRAST  Contact: [phone]  Review results note with patient  ----- Message -----  From: Tiffanie Waters  Sent: 1/11/2023   3:59 PM EST  To: [facility] Clinical Support P  Subject: Question regarding MRI PITUITARY W WO CONTRA#    So what are my results my brain scans were ok???

## 2023-01-27 DIAGNOSIS — E03.8 CENTRAL HYPOTHYROIDISM: Primary | ICD-10-CM

## 2023-01-27 LAB
T4 FREE SERPL-MCNC: 0.82 NG/DL (ref 0.82–1.77)
TSH SERPL DL<=0.005 MIU/L-ACNC: 2.02 UIU/ML (ref 0.45–4.5)

## 2023-03-15 ENCOUNTER — OFFICE VISIT (OUTPATIENT)
Dept: ENDOCRINOLOGY | Facility: CLINIC | Age: 43
End: 2023-03-15
Payer: COMMERCIAL

## 2023-03-15 VITALS
TEMPERATURE: 97.6 F | OXYGEN SATURATION: 93 % | DIASTOLIC BLOOD PRESSURE: 74 MMHG | HEART RATE: 91 BPM | SYSTOLIC BLOOD PRESSURE: 122 MMHG | HEIGHT: 63 IN | WEIGHT: 293 LBS | BODY MASS INDEX: 51.91 KG/M2 | RESPIRATION RATE: 20 BRPM

## 2023-03-15 DIAGNOSIS — R73.03 PREDIABETES: ICD-10-CM

## 2023-03-15 DIAGNOSIS — N91.1 SECONDARY AMENORRHEA: ICD-10-CM

## 2023-03-15 DIAGNOSIS — D35.02 ADRENAL ADENOMA, LEFT: ICD-10-CM

## 2023-03-15 DIAGNOSIS — E66.01 MORBID OBESITY (CMS/HCC): ICD-10-CM

## 2023-03-15 DIAGNOSIS — E03.8 CENTRAL HYPOTHYROIDISM: Primary | ICD-10-CM

## 2023-03-15 PROCEDURE — 3008F BODY MASS INDEX DOCD: CPT | Performed by: INTERNAL MEDICINE

## 2023-03-15 PROCEDURE — 99215 OFFICE O/P EST HI 40 MIN: CPT | Performed by: INTERNAL MEDICINE

## 2023-03-15 NOTE — LETTER
March 15, 2023     Rosey Velasco DO  [address] [facility] [address]    Patient: Tiffanie Waters  YOB: 1980  Date of Visit: 3/15/2023      Dear Dr. Velasco:    Thank you for referring Tiffanie Waters to me for evaluation. Below are my notes for this consultation.    If you have questions, please do not hesitate to call me. I look forward to following your patient along with you.         Sincerely,        Maco Anderson MD        CC: No Recipients    Maco Anderson MD  3/15/2023  2:39 PM  Signed  Tiffanie Waters is a 43 y.o. female who presents today for evaluation and management of hormone dysfunction. Referred by Rosey Velasco DO.     Patient was accompanied by her fiancé who provided additional history  poor historian    Review of labs noted abnormal low free T4 dating back to oct 21    03/15/23   Patient was accompanied by her daughter who provided additional history.  MRI pit - No focal acute intracranial abnormality.  No pituitary adenoma.  Had cycle in feb 24 th  Taking levothyroxine 100 mcg daily  Normal anxiety  MGF passed away in Jan from CHF  11/22 - CT scan - incidental there is a subcentimeter left adrenal myolipoma  Denied palpitations, HTN, no recent fractures  Under lot of stress  Lost weight 10  On trulicity by her PCP  Did not have labs    Patient was accompanied by her close friend Fernanda Kapoor, she noted her  health care aid and want her medical information shared with her if needed  Aug 2022 -acute respiratory failure from co2 intoxication from improer use of BiPAP.  She was admitted at [facility] for 4 days and therefore could not obtain MRI pituitary  September started levothyroxine 75 mcg daily after labs indicated free T4 low and morning cortisol adequate  Taking levothyroxine in the evening along with other medications  She had 1 menstrual cycle in September.  Seen GYN and was given hormone replacement , she could not recall the name.  She opted not to  continue    History  Feb 2021 she had secondary Amenorrhea,  Hormone pill resumed cycles. She followed Dr. Avril Sanders at [facility].  Feb 2022 she had no cycle and march 1 day having bleeding for 1 day and no cycle since then.  She had blood work in March and did not follow-up with GYN as she did not want hormonal pills to resume cycles    H.o of severe morbid obesity, JANICE on BiPAP followed by Pulmonology, anxiety, depression, asthma, mariajuana use, tobacco use, LLE DVT on Eliquis, b/l knee OA, HDS, chronic hypoxemia on nocturnal oxygen, low progesterone, prediabetes, ambulatory dysfunction    Menarche: 13  Menstrual cycles: reg,  2014 uterine polyp irreg and resolved. Feb 2021 - had secondary amenorrhea and Hormone pill resumed cycles.  Recurrence of ammenorrhea since feb 2022    LMP above  Pregnancy no,  plans yes    H/o acne: from CPAP  H/o Hirsutism: no    Weight gain: heavy since childhood, lost 40 # and gained back  Cold intolerance no, more heat, constipation  no    H/o Fractures/Osteoporosis: as adult no  Wide abdominal Striae: no  H/o Proximal muscle weakness: no  H/o Chronic /Recent Steroid Use no  H/o HTN no  H/o DM predm  Excessive skin fragility no    Galactorrhea no  Headaches ns  Tunnel vision no  Change in shoe size no  Insomnia JANICE, BIPAP.         Past Medical History:   Diagnosis Date   • Absence of menstruation      Past Surgical History:   Procedure Laterality Date   • CERVICAL POLYPECTOMY     • WISDOM TOOTH EXTRACTION       Family History   Problem Relation Age of Onset   • Asthma Biological Mother    • Hyperlipidemia Biological Mother    • Hypertension Biological Mother    • Thyroid disease Biological Mother    • Diabetes Biological Mother    • Clotting disorder Biological Mother    • Kidney disease Biological Father    • Hyperlipidemia Biological Father    • Hypertension Biological Father    • Diabetes Biological Father    • Heart failure Biological Sister    • Asthma Biological Sister     • Hypertension Biological Sister    • Cancer Biological Sister    • Clotting disorder Biological Sister    • Cervical cancer Biological Sister    • Clotting disorder Maternal Grandmother    • Asthma Maternal Grandmother    • Uterine cancer Maternal Grandmother    • Kidney disease Maternal Grandfather    • Colon cancer Maternal Grandfather    • Lung cancer Maternal Grandfather    • Heart disease Paternal Grandmother    • Hip dysplasia Paternal Grandmother    • Stroke Paternal Grandfather    • Prostate cancer Paternal Grandfather    • Heart failure Paternal Grandfather      Current Outpatient Medications   Medication Sig Dispense Refill   • albuterol HFA (VENTOLIN HFA) 90 mcg/actuation inhaler INHALE 2 PUFFS BY MOUTH EVERY 4- 6 HOURS AS NEEDED     • apixaban (ELIQUIS) 5 mg tablet TAKE 1 TABLET(5 MG) BY MOUTH TWICE DAILY     • busPIRone (BUSPAR) 15 mg tablet Take 15 mg by mouth 2 (two) times a day.     • doxepin (SINEquan) 10 mg capsule Take 1 mg by mouth nightly.     • fluticasone propion-salmeteroL (ADVAIR HFA) 115-21 mcg/actuation inhaler INHALE 2 PUFFS INTO THE LUNGS TWICE DAILY     • gabapentin (NEURONTIN) 100 mg capsule Take 100 mg by mouth 3 (three) times a day.     • levothyroxine (SYNTHROID) 100 mcg tablet Take 1 tablet (100 mcg total) by mouth daily. 90 tablet 3   • omeprazole (PriLOSEC) 40 mg capsule take 1 capsule by mouth IN THE MORNING and 1 capsule at bedtime     • QUEtiapine (SEROquel) 400 mg tablet Take 400 mg by mouth nightly. 400 mg at night , 50 in am, 50 in aftrnoon     • rosuvastatin (CRESTOR) 5 mg tablet      • topiramate (TOPAMAX) 100 mg tablet Take 100 mg by mouth daily.     • TRULICITY 1.5 mg/0.5 mL pen injector INJECT 0.5ML INTO THE SKIN EVERY 7 DAYS     • venlafaxine XR (EFFEXOR-XR) 150 mg 24 hr capsule Take 150 mg by mouth every morning.     • gabapentin (NEURONTIN) 400 mg capsule Take 400 mg by mouth 3 (three) times a day.     • metFORMIN (GLUCOPHAGE) 500 mg tablet        No current  "facility-administered medications for this visit.     No Known Allergies  Social History     Socioeconomic History   • Marital status: Single     Spouse name: None   • Number of children: None   • Years of education: None   • Highest education level: None   Tobacco Use   • Smoking status: Every Day     Packs/day: 0.50     Types: Cigarettes   • Smokeless tobacco: Never   Substance and Sexual Activity   • Alcohol use: Never   • Drug use: Yes     Types: Marijuana   • Sexual activity: Defer       ROS:  The complete review of systems is otherwise negative except as noted in HPI.      PHYSICAL EXAM:  Vitals:    03/15/23 1310   BP: 122/74   BP Location: Right upper arm   Patient Position: Sitting   Pulse: 91   Resp: 20   Temp: 36.4 °C (97.6 °F)   TempSrc: Temporal   SpO2: 93%   Weight: (!) 163 kg (359 lb)   Height: 1.6 m (5' 3\")       Body mass index is 63.59 kg/m².    GENERAL: Well developed, well nourished, in no acute distress, morbidly obese in wheelchair  EYES: Extraocular movements intact, conjunctiva no chemosis, no proptosis, no lid lag, retraction  NECK: Supple, nl anterior cervical lymphadenopathy  THYROID: thyroid palpable, difficult to palpate because of body habitus, no distinct nodules palpated, non tender on my exam today  CARDIOVASCULAR: S1, S2 heard  RESPIRATORY: Symmetrical chest expansion, normal respiratory effort,  GASTROINTESTINAL: Soft, non tender, wide striae no  MUSCULOSKELETAL: no cyanosis, normal muscle mass, no edema in lower extremities  SKIN: Warm, dry, no lesions, no bruise  NEUROLOGIC: Awake, alert, DTR normal, tremors no    Outside records and notes: reviewed. Pertinent positives summarized in HPI    LABS:   3/11/22 - TSH 2.43, free T4 0.56L, prolactin 10.7, FSH 4.7, LH 5, A1c 6.4, estradiol 127, progesterone 0.2, beta-hCG <1, total testosterone 7, free testosterone 0.7 normal,    10/21 -calcium 9.3, A1c 6.3, free T4 0.54, TSH 4, thyroglobulin antibody<1   Latest Reference Range & Units " 22 10:23   ACTH, Plasma 7.2 - 63.3 pg/mL 48.2   Cortisol ug/dL 10.1      Latest Reference Range & Units 22 10:41 22 10:43   Hemoglobin A1C 4.8 - 5.6 % 5.8 (H) 5.7 (H)   (H): Data is abnormally high   Latest Reference Range & Units 22 10:41 22 10:44   TSH 0.450 - 4.500 uIU/mL 2.670 2.500   Free T4 0.82 - 1.77 ng/dL 0.75 (L) 0.74 (L)   Calcium 8.7 - 10.2 mg/dL 8.9    Free T4 by Dialysis/Mass Spec ng/dL  0.76   (L): Data is abnormally low    Lab Results   Component Value Date    TSH 2.020 2023    TSH 2.500 2022    TSH 2.670 2022     No results found for: THYROIDAB, TSI  No results found for: ALT, WBC     Chemistry        Component Value Date/Time     2022 1041    K 4.0 2022 1041     2022 1041    CO2 24 2022 1041    BUN 6 2022 1041    CREATININE 0.58 2022 1041    No results found for: CALCIUM, ALKPHOS, AST, ALT, BILITOT      IMAGIN/22 - CT scan - incidental there is a subcentimeter left adrenal myolipoma  1. Normal pancreas, given the limitations of this noncontrast enhanced exam, noting fatty infiltration. No focal mass. No peripancreatic abnormality.   2. Pattern is splenomegaly and diffuse hepatic steatosis.   3. Nonacute incidental findings as reported above.         MRI pit   COMPARISON:  None     COMMENT: No intraparenchymal mass, acute infarcts or hemorrhages are seen.  No  mass is seen in the sella turcica or in the pituitary gland.  Contrast-enhanced  images through the pituitary demonstrate normal homogeneous enhancement of the  pituitary.  No mass is identified in the  suprasellar region.     The ventricles and cisterns are normal. No extra-axial masses or focal fluid  collections are seen.     Post contrast-enhanced images demonstrate no abnormal enhancement.  Soft tissue  prominence is seen in the nasopharynx likely representing prominent adenoids.  Minimal fluid signal is seen in the bilateral mastoid air  cells.  Mild mucosal  thickening is seen in the left maxillary sinus.     --  IMPRESSION:     1.  No focal acute intracranial abnormality.  2.  No pituitary adenoma.    PATHOLOGY:    ASSESSMENT AND PLAN:  This is a 43 y.o. female here for follow up.    1.  Central hypothyroidism  TSH normal, chr low free T4, including free T4 equilibrium dialysis  Prolactin, total testosterone, morning cortisol normal.     Last TSH mid normal, free T4 low end of normal  Continue Levothyroxine 100 mcg daily  She will have thyroid function tests, will provide further recommendation after review of lab results    Reviewed the correct way to take thyroid medication is on empty stomach and wait at least 30 -60 minutes before eating or taking other medications. Avoid taking any calcium, iron, or vitamin supplements for 3 hours after taking thyroid hormone.   MRI of the pituitary no pituitary tumor.      2. Secondary ammenorrhea  March labs fall in follicular/ovulatory phase.   Advised to follow-up with GYN for HRT    3.  Prediabetes/morbid obesity  On Trulicity by her primary care physician  She declined bariatric surgery  Not interested in follow-up with comprehensive [facility] wellness center    4.  Adrenal myelolipoma - new  Incidental left CT subcentimeter adrenal myelolipoma    I explained that incidental adrenal masses are commonly found on CT scan (about 3%) or autopsy (about 9%). The concern is that they could be malignant ~ 2% (adrenal primary or metastasis), or functional; pheochromocytoma, cortisol-secreting adenomas (sub/Cushing's syndrome) and aldosteronomas. These are identified by suggestive history, radiographic characteristics and laboratory values. Overall, functional lesions are seen in about 15% of cases and adrenocortical carcinomas are rare, particularly in lesions under 4cm. Benign entities, such as benign non-functioning adenomas and myelolipomas are the most common small incidental adrenal masses.     She wishes  to hold off on hormonal testing given her stress, but proceed with CT  Check BMP,  DHEA-s done at 8 am   Check plasma metanephrines   ACTH nl 48, cortisol 10.  No clinical signs of Cushing's, expect ACTH to be lower with adrenal Cushing's    Check CT scan abdomen with and without contrast - adrenal protocol       I have answered all of my patient's questions to the best of my ability. To call us with any changes    Return to office in 3 months or earlier if issues arise     Orders Placed This Encounter   Procedures   • CT ABDOMEN WITH AND WITHOUT IV CONTRAST   • Basic metabolic panel   • DHEA-sulfate   • Metanephrines, Fractionated Plasma     I spent 41 minutes on this day of service performing the following activities; obtaining history, performing examination, entering orders, documenting, preparing for visit, obtaining/reviewing records, providing counseling and education and coordinating care.    This note was sent to PCP    This patient has been dictated using speech recognition software. Inadvertent speech recognition errors should be disregarded. Please do not hesitate to call the office for clarification.

## 2023-03-15 NOTE — PATIENT INSTRUCTIONS
Remember when doing the testing:       Foods and Drugs to avoid before testing   Please contact your physician for advice on adjusting or stopping medications.     The following medications should be stopped 14 days prior to Chromogranin A, plasma Metanephrines and 24 hour urinalysis        Antihistamines (for allergies) and cold/decongestant medications such as Benadryl, Dimedrol, Nytol, Unisom, Guaifenesin (Robitussin), Loratadine (Claritin), Fexofenadine (Allegra), Cetirizine      ? All H2 Blockers: (for stomach/digestive problems) Nexium, Pepsid, Zantac, Prevacid, Prilosec, Gaviscon, Axid (Nizatidine), Tagamet, Tums   ? Anxiolytics (for anxiety) such as Xanax, Valium, Trazodone, Ativan, and other anti-anxiety medications, Sleep aids such as Ambien and Lunesta  ? All Tylenol/acetaminophen products, including Excedrin, Percocet and hydrocodone, should be stopped a minimum of 5 days prior to biochemical testing   ?   Food Restrictions (NIH guidelines)   ? No caffeine or decaf products or choclates for a minimum of 24 hours prior to your test  ? No spicy foods, cheese, and citrus fruits, No Tryptophan-rich foods, such as: avocados, pineapples, bananas, kiwi fruit, plums, eggplants, walnuts, hickory nuts, pecans, tomatoes, plantains for a minimum of 24 hours prior to your test   ? NO ALCOHOL for 24 hours prior to your test   ? NO SMOKING (cigarettes or marijuana) for 24 HOURS prior to your test

## 2023-03-15 NOTE — PROGRESS NOTES
Tiffanie Waters is a 43 y.o. female who presents today for evaluation and management of hormone dysfunction. Referred by Rosey Velasco DO.     Patient was accompanied by her fiancé who provided additional history  poor historian    Review of labs noted abnormal low free T4 dating back to oct 21    03/15/23   Patient was accompanied by her daughter who provided additional history.  MRI pit - No focal acute intracranial abnormality.  No pituitary adenoma.  Had cycle in feb 24 th  Taking levothyroxine 100 mcg daily  Normal anxiety  MGF passed away in Jan from CHF  11/22 - CT scan - incidental there is a subcentimeter left adrenal myolipoma  Denied palpitations, HTN, no recent fractures  Under lot of stress  Lost weight 10  On trulicity by her PCP  Did not have labs    Patient was accompanied by her close friend Fernanda Kapoor, she noted her  health care aid and want her medical information shared with her if needed  Aug 2022 -acute respiratory failure from co2 intoxication from improer use of BiPAP.  She was admitted at [facility] for 4 days and therefore could not obtain MRI pituitary  September started levothyroxine 75 mcg daily after labs indicated free T4 low and morning cortisol adequate  Taking levothyroxine in the evening along with other medications  She had 1 menstrual cycle in September.  Seen GYN and was given hormone replacement , she could not recall the name.  She opted not to continue    History  Feb 2021 she had secondary Amenorrhea,  Hormone pill resumed cycles. She followed Dr. Avril Sanders at [facility].  Feb 2022 she had no cycle and march 1 day having bleeding for 1 day and no cycle since then.  She had blood work in March and did not follow-up with GYN as she did not want hormonal pills to resume cycles    H.o of severe morbid obesity, JANICE on BiPAP followed by Pulmonology, anxiety, depression, asthma, mariajuana use, tobacco use, LLE DVT on Eliquis, b/l knee OA, HDS, chronic hypoxemia  on nocturnal oxygen, low progesterone, prediabetes, ambulatory dysfunction    Menarche: 13  Menstrual cycles: reg,  2014 uterine polyp irreg and resolved. Feb 2021 - had secondary amenorrhea and Hormone pill resumed cycles.  Recurrence of ammenorrhea since feb 2022    LMP above  Pregnancy no,  plans yes    H/o acne: from CPAP  H/o Hirsutism: no    Weight gain: heavy since childhood, lost 40 # and gained back  Cold intolerance no, more heat, constipation  no    H/o Fractures/Osteoporosis: as adult no  Wide abdominal Striae: no  H/o Proximal muscle weakness: no  H/o Chronic /Recent Steroid Use no  H/o HTN no  H/o DM predm  Excessive skin fragility no    Galactorrhea no  Headaches ns  Tunnel vision no  Change in shoe size no  Insomnia JANICE, BIPAP.         Past Medical History:   Diagnosis Date   • Absence of menstruation      Past Surgical History:   Procedure Laterality Date   • CERVICAL POLYPECTOMY     • WISDOM TOOTH EXTRACTION       Family History   Problem Relation Age of Onset   • Asthma Biological Mother    • Hyperlipidemia Biological Mother    • Hypertension Biological Mother    • Thyroid disease Biological Mother    • Diabetes Biological Mother    • Clotting disorder Biological Mother    • Kidney disease Biological Father    • Hyperlipidemia Biological Father    • Hypertension Biological Father    • Diabetes Biological Father    • Heart failure Biological Sister    • Asthma Biological Sister    • Hypertension Biological Sister    • Cancer Biological Sister    • Clotting disorder Biological Sister    • Cervical cancer Biological Sister    • Clotting disorder Maternal Grandmother    • Asthma Maternal Grandmother    • Uterine cancer Maternal Grandmother    • Kidney disease Maternal Grandfather    • Colon cancer Maternal Grandfather    • Lung cancer Maternal Grandfather    • Heart disease Paternal Grandmother    • Hip dysplasia Paternal Grandmother    • Stroke Paternal Grandfather    • Prostate cancer Paternal  Grandfather    • Heart failure Paternal Grandfather      Current Outpatient Medications   Medication Sig Dispense Refill   • albuterol HFA (VENTOLIN HFA) 90 mcg/actuation inhaler INHALE 2 PUFFS BY MOUTH EVERY 4- 6 HOURS AS NEEDED     • apixaban (ELIQUIS) 5 mg tablet TAKE 1 TABLET(5 MG) BY MOUTH TWICE DAILY     • busPIRone (BUSPAR) 15 mg tablet Take 15 mg by mouth 2 (two) times a day.     • doxepin (SINEquan) 10 mg capsule Take 1 mg by mouth nightly.     • fluticasone propion-salmeteroL (ADVAIR HFA) 115-21 mcg/actuation inhaler INHALE 2 PUFFS INTO THE LUNGS TWICE DAILY     • gabapentin (NEURONTIN) 100 mg capsule Take 100 mg by mouth 3 (three) times a day.     • levothyroxine (SYNTHROID) 100 mcg tablet Take 1 tablet (100 mcg total) by mouth daily. 90 tablet 3   • omeprazole (PriLOSEC) 40 mg capsule take 1 capsule by mouth IN THE MORNING and 1 capsule at bedtime     • QUEtiapine (SEROquel) 400 mg tablet Take 400 mg by mouth nightly. 400 mg at night , 50 in am, 50 in aftrnoon     • rosuvastatin (CRESTOR) 5 mg tablet      • topiramate (TOPAMAX) 100 mg tablet Take 100 mg by mouth daily.     • TRULICITY 1.5 mg/0.5 mL pen injector INJECT 0.5ML INTO THE SKIN EVERY 7 DAYS     • venlafaxine XR (EFFEXOR-XR) 150 mg 24 hr capsule Take 150 mg by mouth every morning.     • gabapentin (NEURONTIN) 400 mg capsule Take 400 mg by mouth 3 (three) times a day.     • metFORMIN (GLUCOPHAGE) 500 mg tablet        No current facility-administered medications for this visit.     No Known Allergies  Social History     Socioeconomic History   • Marital status: Single     Spouse name: None   • Number of children: None   • Years of education: None   • Highest education level: None   Tobacco Use   • Smoking status: Every Day     Packs/day: 0.50     Types: Cigarettes   • Smokeless tobacco: Never   Substance and Sexual Activity   • Alcohol use: Never   • Drug use: Yes     Types: Marijuana   • Sexual activity: Defer       ROS:  The complete review of  "systems is otherwise negative except as noted in HPI.      PHYSICAL EXAM:  Vitals:    03/15/23 1310   BP: 122/74   BP Location: Right upper arm   Patient Position: Sitting   Pulse: 91   Resp: 20   Temp: 36.4 °C (97.6 °F)   TempSrc: Temporal   SpO2: 93%   Weight: (!) 163 kg (359 lb)   Height: 1.6 m (5' 3\")       Body mass index is 63.59 kg/m².    GENERAL: Well developed, well nourished, in no acute distress, morbidly obese in wheelchair  EYES: Extraocular movements intact, conjunctiva no chemosis, no proptosis, no lid lag, retraction  NECK: Supple, nl anterior cervical lymphadenopathy  THYROID: thyroid palpable, difficult to palpate because of body habitus, no distinct nodules palpated, non tender on my exam today  CARDIOVASCULAR: S1, S2 heard  RESPIRATORY: Symmetrical chest expansion, normal respiratory effort,  GASTROINTESTINAL: Soft, non tender, wide striae no  MUSCULOSKELETAL: no cyanosis, normal muscle mass, no edema in lower extremities  SKIN: Warm, dry, no lesions, no bruise  NEUROLOGIC: Awake, alert, DTR normal, tremors no    Outside records and notes: reviewed. Pertinent positives summarized in HPI    LABS:   3/11/22 - TSH 2.43, free T4 0.56L, prolactin 10.7, FSH 4.7, LH 5, A1c 6.4, estradiol 127, progesterone 0.2, beta-hCG <1, total testosterone 7, free testosterone 0.7 normal,    10/21 -calcium 9.3, A1c 6.3, free T4 0.54, TSH 4, thyroglobulin antibody<1   Latest Reference Range & Units 08/12/22 10:23   ACTH, Plasma 7.2 - 63.3 pg/mL 48.2   Cortisol ug/dL 10.1      Latest Reference Range & Units 12/05/22 10:41 12/05/22 10:43   Hemoglobin A1C 4.8 - 5.6 % 5.8 (H) 5.7 (H)   (H): Data is abnormally high   Latest Reference Range & Units 12/05/22 10:41 12/05/22 10:44   TSH 0.450 - 4.500 uIU/mL 2.670 2.500   Free T4 0.82 - 1.77 ng/dL 0.75 (L) 0.74 (L)   Calcium 8.7 - 10.2 mg/dL 8.9    Free T4 by Dialysis/Mass Spec ng/dL  0.76   (L): Data is abnormally low    Lab Results   Component Value Date    TSH 2.020 " 2023    TSH 2.500 2022    TSH 2.670 2022     No results found for: THYROIDAB, TSI  No results found for: ALT, WBC     Chemistry        Component Value Date/Time     2022 1041    K 4.0 2022 1041     2022 1041    CO2 24 2022 1041    BUN 6 2022 1041    CREATININE 0.58 2022 1041    No results found for: CALCIUM, ALKPHOS, AST, ALT, BILITOT      IMAGIN/22 - CT scan - incidental there is a subcentimeter left adrenal myolipoma  1. Normal pancreas, given the limitations of this noncontrast enhanced exam, noting fatty infiltration. No focal mass. No peripancreatic abnormality.   2. Pattern is splenomegaly and diffuse hepatic steatosis.   3. Nonacute incidental findings as reported above.         MRI pit   COMPARISON:  None     COMMENT: No intraparenchymal mass, acute infarcts or hemorrhages are seen.  No  mass is seen in the sella turcica or in the pituitary gland.  Contrast-enhanced  images through the pituitary demonstrate normal homogeneous enhancement of the  pituitary.  No mass is identified in the  suprasellar region.     The ventricles and cisterns are normal. No extra-axial masses or focal fluid  collections are seen.     Post contrast-enhanced images demonstrate no abnormal enhancement.  Soft tissue  prominence is seen in the nasopharynx likely representing prominent adenoids.  Minimal fluid signal is seen in the bilateral mastoid air cells.  Mild mucosal  thickening is seen in the left maxillary sinus.     --  IMPRESSION:     1.  No focal acute intracranial abnormality.  2.  No pituitary adenoma.    PATHOLOGY:    ASSESSMENT AND PLAN:  This is a 43 y.o. female here for follow up.    1.  Central hypothyroidism  TSH normal, chr low free T4, including free T4 equilibrium dialysis  Prolactin, total testosterone, morning cortisol normal.     Last TSH mid normal, free T4 low end of normal  Continue Levothyroxine 100 mcg daily  She will have thyroid  function tests, will provide further recommendation after review of lab results    Reviewed the correct way to take thyroid medication is on empty stomach and wait at least 30 -60 minutes before eating or taking other medications. Avoid taking any calcium, iron, or vitamin supplements for 3 hours after taking thyroid hormone.   MRI of the pituitary no pituitary tumor.      2. Secondary ammenorrhea  March labs fall in follicular/ovulatory phase.   Advised to follow-up with GYN for HRT    3.  Prediabetes/morbid obesity  On Trulicity by her primary care physician  She declined bariatric surgery  Not interested in follow-up with [facility]    4.  Adrenal myelolipoma - new  Incidental left CT subcentimeter adrenal myelolipoma    I explained that incidental adrenal masses are commonly found on CT scan (about 3%) or autopsy (about 9%). The concern is that they could be malignant ~ 2% (adrenal primary or metastasis), or functional; pheochromocytoma, cortisol-secreting adenomas (sub/Cushing's syndrome) and aldosteronomas. These are identified by suggestive history, radiographic characteristics and laboratory values. Overall, functional lesions are seen in about 15% of cases and adrenocortical carcinomas are rare, particularly in lesions under 4cm. Benign entities, such as benign non-functioning adenomas and myelolipomas are the most common small incidental adrenal masses.     She wishes to hold off on hormonal testing given her stress, but proceed with CT  Check BMP,  DHEA-s done at 8 am   Check plasma metanephrines   ACTH nl 48, cortisol 10.  No clinical signs of Cushing's, expect ACTH to be lower with adrenal Cushing's    Check CT scan abdomen with and without contrast - adrenal protocol       I have answered all of my patient's questions to the best of my ability. To call us with any changes    Return to office in 3 months or earlier if issues arise     Orders Placed This Encounter    Procedures   • CT ABDOMEN WITH AND WITHOUT IV CONTRAST   • Basic metabolic panel   • DHEA-sulfate   • Metanephrines, Fractionated Plasma     I spent 41 minutes on this day of service performing the following activities; obtaining history, performing examination, entering orders, documenting, preparing for visit, obtaining/reviewing records, providing counseling and education and coordinating care.    This note was sent to PCP    This patient has been dictated using speech recognition software. Inadvertent speech recognition errors should be disregarded. Please do not hesitate to call the office for clarification.

## 2023-03-24 DIAGNOSIS — E03.8 CENTRAL HYPOTHYROIDISM: Primary | ICD-10-CM

## 2023-03-24 LAB
T4 FREE SERPL-MCNC: 0.83 NG/DL (ref 0.82–1.77)
TSH SERPL DL<=0.005 MIU/L-ACNC: 1.27 UIU/ML (ref 0.45–4.5)

## 2023-07-09 LAB
METANEPH FREE SERPL-MCNC: 117.2 PG/ML (ref 0–88)
NORMETANEPHRINE SERPL-MCNC: 143.2 PG/ML (ref 0–218.9)

## 2023-07-11 PROBLEM — N91.5 OLIGOMENORRHEA: Status: ACTIVE | Noted: 2022-06-16

## 2023-07-11 PROBLEM — K21.9 GASTROESOPHAGEAL REFLUX DISEASE: Status: ACTIVE | Noted: 2023-02-21

## 2023-07-11 PROBLEM — J45.909 ASTHMA: Status: ACTIVE | Noted: 2022-09-19

## 2023-07-11 PROBLEM — E03.8 CENTRAL HYPOTHYROIDISM: Status: ACTIVE | Noted: 2021-10-08

## 2023-07-11 PROBLEM — R09.02 HYPOXEMIA: Status: ACTIVE | Noted: 2022-09-19

## 2023-07-11 PROBLEM — R76.0 ANTICARDIOLIPIN ANTIBODY POSITIVE: Status: ACTIVE | Noted: 2022-08-30

## 2023-07-11 PROBLEM — E66.01 MORBID OBESITY (CMS/HCC): Status: ACTIVE | Noted: 2022-09-19

## 2023-07-11 PROBLEM — E11.21 DIABETIC NEPHROPATHY (CMS/HCC): Status: ACTIVE | Noted: 2022-08-14

## 2023-07-11 PROBLEM — F43.10 PTSD (POST-TRAUMATIC STRESS DISORDER): Status: ACTIVE | Noted: 2022-08-30

## 2023-07-11 PROBLEM — E11.9 TYPE 2 DIABETES MELLITUS WITHOUT COMPLICATION, WITHOUT LONG-TERM CURRENT USE OF INSULIN (CMS/HCC): Status: ACTIVE | Noted: 2021-03-05

## 2023-07-11 PROBLEM — R06.83 SNORING: Status: ACTIVE | Noted: 2022-09-19

## 2023-07-11 PROBLEM — I82.402 LEFT LEG DVT (CMS/HCC): Status: ACTIVE | Noted: 2022-09-19

## 2023-07-11 PROBLEM — G47.33 OSA TREATED WITH BIPAP: Status: ACTIVE | Noted: 2022-09-19

## 2023-07-11 RX ORDER — ESOMEPRAZOLE MAGNESIUM 40 MG/1
40 CAPSULE, DELAYED RELEASE ORAL
COMMUNITY
Start: 2023-02-22 | End: 2024-02-22

## 2023-07-11 RX ORDER — INCONTINENCE PAD,LINER,DISP
EACH MISCELLANEOUS
COMMUNITY
Start: 2023-01-25

## 2023-07-14 ENCOUNTER — TELEPHONE (OUTPATIENT)
Dept: ENDOCRINOLOGY | Facility: CLINIC | Age: 43
End: 2023-07-14

## 2023-07-17 RX ORDER — METHYLPREDNISOLONE 4 MG/1
TABLET ORAL
COMMUNITY
Start: 2023-04-11

## 2023-07-17 RX ORDER — METOCLOPRAMIDE 10 MG/1
TABLET ORAL
COMMUNITY
Start: 2023-05-05

## 2023-07-17 RX ORDER — BLOOD SUGAR DIAGNOSTIC
STRIP MISCELLANEOUS
COMMUNITY
Start: 2023-07-13

## 2023-07-17 RX ORDER — LANCETS 30 GAUGE
EACH MISCELLANEOUS
COMMUNITY
Start: 2023-05-26

## 2023-07-17 RX ORDER — BUDESONIDE AND FORMOTEROL FUMARATE DIHYDRATE 160; 4.5 UG/1; UG/1
AEROSOL RESPIRATORY (INHALATION)
COMMUNITY
Start: 2023-06-16

## 2023-07-17 RX ORDER — LANCETS 33 GAUGE
EACH MISCELLANEOUS
COMMUNITY
Start: 2023-05-26

## 2023-08-01 ENCOUNTER — TELEMEDICINE (OUTPATIENT)
Dept: ENDOCRINOLOGY | Facility: CLINIC | Age: 43
End: 2023-08-01
Payer: COMMERCIAL

## 2023-08-01 DIAGNOSIS — N91.1 SECONDARY AMENORRHEA: ICD-10-CM

## 2023-08-01 DIAGNOSIS — E66.01 MORBID OBESITY (CMS/HCC): ICD-10-CM

## 2023-08-01 DIAGNOSIS — E03.8 CENTRAL HYPOTHYROIDISM: Primary | ICD-10-CM

## 2023-08-01 DIAGNOSIS — D35.02 ADRENAL ADENOMA, LEFT: ICD-10-CM

## 2023-08-01 PROBLEM — K08.409 WISDOM TEETH REMOVED: Status: ACTIVE | Noted: 2023-08-01

## 2023-08-01 PROBLEM — R79.89 ELEVATED PLASMA METANEPHRINES: Status: ACTIVE | Noted: 2023-07-20

## 2023-08-01 PROBLEM — F31.9 BIPOLAR DISORDER (CMS/HCC): Status: ACTIVE | Noted: 2018-07-08

## 2023-08-01 PROBLEM — D72.829 LEUKOCYTOSIS: Status: ACTIVE | Noted: 2023-07-07

## 2023-08-01 PROBLEM — Z90.49 S/P CHOLECYSTECTOMY: Status: ACTIVE | Noted: 2023-08-01

## 2023-08-01 PROBLEM — S92.911A: Status: ACTIVE | Noted: 2023-08-01

## 2023-08-01 PROCEDURE — 99214 OFFICE O/P EST MOD 30 MIN: CPT | Mod: GT | Performed by: INTERNAL MEDICINE

## 2023-08-01 RX ORDER — DULAGLUTIDE 3 MG/.5ML
INJECTION, SOLUTION SUBCUTANEOUS
COMMUNITY
Start: 2023-07-28

## 2023-08-01 NOTE — PROGRESS NOTES
Verification of Patient Location:  The patient affirms they are currently located in the following state: [place]    Request for Consent:    Audio and Video Encounter   Anastasiya, my name is Maco Anderson MD.  Before we proceed, can you please verify your identification by telling me your full name and date of birth?  Can you tell me who is in the room with you?    You and I are about to have a telemedicine check-in or visit because you have requested it.  This is a live video-conference.  I am a real person, speaking to you in real time.  There is no one else with me on the video-conference. I am not recording this conversation and I am asking you not to record it.  This telemedicine visit will be billed to your health insurance or you, if you are self-insured.  You understand you will be responsible for any copayments or coinsurances that apply to your telemedicine visit.  Communication platform used for this encounter:  HidIn[employer] Video Visit (Epic Video Client)       Before starting our telemedicine visit, I am required to get your consent for this virtual check-in or visit by telemedicine. Do you consent?      Patient Response to Request for Consent:  Yes      Tiffanie Waters is a 43 y.o. female who presents today for evaluation and management of hormone dysfunction. Referred by Rosey Velasco DO.     Patient was accompanied by her fiancé who provided additional history  poor historian    Review of labs noted abnormal low free T4 dating back to oct 21    08/01/23   Reports working with hematologist for high WBC  Had 2 spontaneous cycles last 2 months  Stopped hair, nail, skin vitamin  Lost weight 53#with diet modification and postsurgery  On trulicity by her PCP  Changed to new pulmonologist, changed to Symbicort  She will be having EGD soon   Could not come in person car broke down   Taking levothyroxine 100 mcg daily  Could not have CT yet, prior authorization completed  She is under a lot of  stress      MRI pit - No focal acute intracranial abnormality.  No pituitary adenoma.  Had cycle in feb 24 th  Normal anxiety  MGF passed away in Jan from CHF  11/22 - CT scan - incidental there is a subcentimeter left adrenal myolipoma  Denied palpitations, HTN, no recent fractures  Under lot of stress  Aug 25 th -   Lost weight 10  On trulicity by her PCP  Did not have labs    Patient was accompanied by her close friend Fernanda Kapoor, she noted her  health care aid and want her medical information shared with her if needed  Aug 2022 -acute respiratory failure from co2 intoxication from improer use of BiPAP.  She was [facility] at [facility] for 4 days and therefore could not obtain MRI pituitary  September started levothyroxine 75 mcg daily after labs indicated free T4 low and morning cortisol adequate  Taking levothyroxine in the evening along with other medications  She had 1 menstrual cycle in September.  Seen GYN and was given hormone replacement , she could not recall the name.  She opted not to continue    History  Feb 2021 she had secondary Amenorrhea,  Hormone pill resumed cycles. She followed Dr. Avril Sanders at [facility].  Feb 2022 she had no cycle and march 1 day having bleeding for 1 day and no cycle since then.  She had blood work in March and did not follow-up with GYN as she did not want hormonal pills to resume cycles    H.o of severe morbid obesity, JANICE on BiPAP followed by Pulmonology, anxiety, depression, asthma, mariajuana use, tobacco use, LLE DVT on Eliquis, b/l knee OA, HDS, chronic hypoxemia on nocturnal oxygen, low progesterone, prediabetes, ambulatory dysfunction    Menarche: 13  Menstrual cycles: reg,  2014 uterine polyp irreg and resolved. Feb 2021 - had secondary amenorrhea and Hormone pill resumed cycles.  Recurrence of ammenorrhea since feb 2022    LMP above  Pregnancy no,  plans yes    H/o acne: from CPAP  H/o Hirsutism: no    Weight gain: heavy since childhood, lost 40  # and gained back  Cold intolerance no, more heat, constipation  no    H/o Fractures/Osteoporosis: as adult no  Wide abdominal Striae: no  H/o Proximal muscle weakness: no  H/o Chronic /Recent Steroid Use no  H/o HTN no  H/o DM predm  Excessive skin fragility no    Galactorrhea no  Headaches ns  Tunnel vision no  Change in shoe size no  Insomnia JANICE, BIPAP.         Past Medical History:   Diagnosis Date   • Absence of menstruation      Past Surgical History:   Procedure Laterality Date   • CERVICAL POLYPECTOMY     • WISDOM TOOTH EXTRACTION       Family History   Problem Relation Age of Onset   • Asthma Biological Mother    • Hyperlipidemia Biological Mother    • Hypertension Biological Mother    • Thyroid disease Biological Mother    • Diabetes Biological Mother    • Clotting disorder Biological Mother    • Kidney disease Biological Father    • Hyperlipidemia Biological Father    • Hypertension Biological Father    • Diabetes Biological Father    • Heart failure Biological Sister    • Asthma Biological Sister    • Hypertension Biological Sister    • Cancer Biological Sister    • Clotting disorder Biological Sister    • Cervical cancer Biological Sister    • Clotting disorder Maternal Grandmother    • Asthma Maternal Grandmother    • Uterine cancer Maternal Grandmother    • Kidney disease Maternal Grandfather    • Colon cancer Maternal Grandfather    • Lung cancer Maternal Grandfather    • Heart disease Paternal Grandmother    • Hip dysplasia Paternal Grandmother    • Stroke Paternal Grandfather    • Prostate cancer Paternal Grandfather    • Heart failure Paternal Grandfather      Current Outpatient Medications   Medication Sig Dispense Refill   • albuterol HFA (VENTOLIN HFA) 90 mcg/actuation inhaler INHALE 2 PUFFS BY MOUTH EVERY 4- 6 HOURS AS NEEDED     • apixaban (ELIQUIS) 5 mg tablet TAKE 1 TABLET(5 MG) BY MOUTH TWICE DAILY     • busPIRone (BUSPAR) 15 mg tablet Take 15 mg by mouth 2 (two) times a day.     •  diaper,brief,adult,disposable (DEPEND EASY FIT UNDERGARMENTS) misc Womens underwear size 3x     • doxepin (SINEquan) 10 mg capsule Take 1 mg by mouth nightly.     • esomeprazole (NexIUM) 40 mg capsule Take 40 mg by mouth.     • gabapentin (NEURONTIN) 100 mg capsule Take 100 mg by mouth 3 (three) times a day.     • gabapentin (NEURONTIN) 400 mg capsule Take 400 mg by mouth 3 (three) times a day.     • levothyroxine (SYNTHROID) 100 mcg tablet Take 1 tablet (100 mcg total) by mouth daily. 90 tablet 3   • metoclopramide (REGLAN) 10 mg tablet      • omeprazole (PriLOSEC) 40 mg capsule take 1 capsule by mouth IN THE MORNING and 1 capsule at bedtime     • ONETOUCH DELICA PLUS LANCET 30 gauge misc TEST BLOOD SUGAR AS DIRECTED     • ONETOUCH ULTRA2 METER misc CHECK BLOOD SUGAR DAILY AS DIRECTED BY MD     • QUEtiapine (SEROquel) 400 mg tablet Take 400 mg by mouth nightly. 400 mg at night , 50 in am, 50 in aftrnoon     • rosuvastatin (CRESTOR) 5 mg tablet      • SYMBICORT 160-4.5 mcg/actuation inhaler INHALE 2 PUFFS INTO THE LUNGS TWICE DAILY     • topiramate (TOPAMAX) 100 mg tablet Take 100 mg by mouth daily.     • TRULICITY 3 mg/0.5 mL pen injector      • venlafaxine XR (EFFEXOR-XR) 150 mg 24 hr capsule Take 150 mg by mouth every morning.     • fluticasone propion-salmeteroL (ADVAIR HFA) 115-21 mcg/actuation inhaler INHALE 2 PUFFS INTO THE LUNGS TWICE DAILY     • methylPREDNISolone (MEDROL DOSEPACK) 4 mg tablet as directed TAPER DOSE PER INSTRUCTIONS INSIDE PACKAGE     • ONETOUCH ULTRA TEST strip        No current facility-administered medications for this visit.     No Known Allergies  Social History     Socioeconomic History   • Marital status: Single     Spouse name: None   • Number of children: None   • Years of education: None   • Highest education level: None   Tobacco Use   • Smoking status: Every Day     Packs/day: 0.50     Types: Cigarettes   • Smokeless tobacco: Never   Substance and Sexual Activity   • Alcohol  use: Never   • Drug use: Yes     Types: Marijuana   • Sexual activity: Defer       ROS:  The complete review of systems is otherwise negative except as noted in HPI.      PHYSICAL EXAM:  There were no vitals filed for this visit.    There is no height or weight on file to calculate BMI.    Could not be obtained virtual visit     GENERAL: Well developed, well nourished, in no acute distress  EYES: Extraocular movements intact, conjunctiva no chemosis  NECK: No visible neck changes   RESPIRATORY: Symmetrical chest expansion, normal respiratory effort  NEUROLOGIC: Awake, alert      Outside records and notes: reviewed. Pertinent positives summarized in HPI    LABS:    Latest Reference Range & Units 07/03/23 11:45   Metanephrine, Pl 0.0 - 88.0 pg/mL 117.2 (H)   Normetanephrine, Pl 0.0 - 218.9 pg/mL 143.2   (H): Data is abnormally high    3/11/22 - TSH 2.43, free T4 0.56L, prolactin 10.7, FSH 4.7, LH 5, A1c 6.4, estradiol 127, progesterone 0.2, beta-hCG <1, total testosterone 7, free testosterone 0.7 normal,    10/21 -calcium 9.3, A1c 6.3, free T4 0.54, TSH 4, thyroglobulin antibody<1   Latest Reference Range & Units 08/12/22 10:23   ACTH, Plasma 7.2 - 63.3 pg/mL 48.2   Cortisol ug/dL 10.1      Latest Reference Range & Units 12/05/22 10:41 12/05/22 10:43   Hemoglobin A1C 4.8 - 5.6 % 5.8 (H) 5.7 (H)   (H): Data is abnormally high   Latest Reference Range & Units 12/05/22 10:41 12/05/22 10:44   TSH 0.450 - 4.500 uIU/mL 2.670 2.500   Free T4 0.82 - 1.77 ng/dL 0.75 (L) 0.74 (L)   Calcium 8.7 - 10.2 mg/dL 8.9    Free T4 by Dialysis/Mass Spec ng/dL  0.76   (L): Data is abnormally low    Lab Results   Component Value Date    TSH 1.270 03/23/2023    TSH 2.020 01/26/2023    TSH 2.500 12/05/2022     No results found for: THYROIDAB, TSI  No results found for: ALT, WBC     Chemistry        Component Value Date/Time     12/05/2022 1041    K 4.0 12/05/2022 1041     12/05/2022 1041    CO2 24 12/05/2022 1041    BUN 6  2022 1041    CREATININE 0.58 2022 1041    No results found for: CALCIUM, ALKPHOS, AST, ALT, BILITOT      IMAGIN/22 - CT scan - incidental there is a subcentimeter left adrenal myolipoma  1. Normal pancreas, given the limitations of this noncontrast enhanced exam, noting fatty infiltration. No focal mass. No peripancreatic abnormality.   2. Pattern is splenomegaly and diffuse hepatic steatosis.   3. Nonacute incidental findings as reported above.         MRI pit   COMPARISON:  None     COMMENT: No intraparenchymal mass, acute infarcts or hemorrhages are seen.  No  mass is seen in the sella turcica or in the pituitary gland.  Contrast-enhanced  images through the pituitary demonstrate normal homogeneous enhancement of the  pituitary.  No mass is identified in the  suprasellar region.     The ventricles and cisterns are normal. No extra-axial masses or focal fluid  collections are seen.     Post contrast-enhanced images demonstrate no abnormal enhancement.  Soft tissue  prominence is seen in the nasopharynx likely representing prominent adenoids.  Minimal fluid signal is seen in the bilateral mastoid air cells.  Mild mucosal  thickening is seen in the left maxillary sinus.     --  IMPRESSION:     1.  No focal acute intracranial abnormality.  2.  No pituitary adenoma.    PATHOLOGY:    ASSESSMENT AND PLAN:  This is a 43 y.o. female here for follow up.    1.  Central hypothyroidism  TSH normal, chr low free T4, including free T4 equilibrium dialysis  Prolactin, total testosterone, morning cortisol normal.   2023 - MRI pituitary - no pituitary tumor.        Last TSH mid normal, free T4 low end of normal  Continue Levothyroxine 100 mcg daily  She will have thyroid function tests, will provide further recommendation after review of lab results    2. Secondary ammenorrhea  March labs fall in follicular/ovulatory phase.   She had 2 spontaneous menstrual cycles, held off on HRT  She wishes not to take  additional medications      3.  Prediabetes/morbid obesity  On Trulicity by her primary care physician  She declined bariatric surgery  Not interested in follow-up with comprehensive [facility]    4.  Adrenal myelolipoma -  Incidental left CT subcentimeter adrenal myelolipoma  Advised to have CT adrenal protocol  Plasma metanephrine mildly elevated likely stress response, medications.  Less likely pheochromocytoma given subcentimeter and lipid rich based on CT explained in detail false positives  Consider repeating in 6 months  Check BMP,  DHEA-s done at 8 am     I explained that incidental adrenal masses are commonly found on CT scan (about 3%) or autopsy (about 9%). The concern is that they could be malignant ~ 2% (adrenal primary or metastasis), or functional; pheochromocytoma, cortisol-secreting adenomas (sub/Cushing's syndrome) and aldosteronomas. These are identified by suggestive history, radiographic characteristics and laboratory values. Overall, functional lesions are seen in about 15% of cases and adrenocortical carcinomas are rare, particularly in lesions under 4cm. Benign entities, such as benign non-functioning adenomas and myelolipomas are the most common small incidental adrenal masses.         I have answered all of my patient's questions to the best of my ability. To call us with any changes    Return to office in 4 months or earlier if issues arise     Orders Placed This Encounter   Procedures   • TSH 3rd Generation   • T4, free   • Basic metabolic panel   • DHEA-sulfate         This patient has been dictated using speech recognition software. Inadvertent speech recognition errors should be disregarded. Please do not hesitate to call the office for clarification.

## 2023-08-30 ENCOUNTER — TELEPHONE (OUTPATIENT)
Dept: ENDOCRINOLOGY | Facility: CLINIC | Age: 43
End: 2023-08-30

## 2023-08-30 NOTE — TELEPHONE ENCOUNTER
Tomeka from [facility] needs Catscan script faxed to [phone]. Pt has appt scheduled tomorrow. Please assist.

## 2023-12-04 RX ORDER — LIRAGLUTIDE 6 MG/ML
INJECTION SUBCUTANEOUS
COMMUNITY
Start: 2023-11-09

## 2023-12-04 RX ORDER — METHOCARBAMOL 750 MG/1
TABLET, FILM COATED ORAL
COMMUNITY
Start: 2023-10-19

## 2023-12-04 RX ORDER — DULAGLUTIDE 0.75 MG/.5ML
INJECTION, SOLUTION SUBCUTANEOUS
COMMUNITY
Start: 2023-09-07

## 2023-12-04 RX ORDER — BUSPIRONE HYDROCHLORIDE 30 MG/1
30 TABLET ORAL 2 TIMES DAILY
COMMUNITY
Start: 2023-11-06

## 2023-12-04 RX ORDER — PEN NEEDLE, DIABETIC 31 GX5/16"
NEEDLE, DISPOSABLE MISCELLANEOUS
COMMUNITY
Start: 2023-11-09

## 2024-01-18 ENCOUNTER — TELEPHONE (OUTPATIENT)
Dept: ENDOCRINOLOGY | Facility: CLINIC | Age: 44
End: 2024-01-18

## 2024-01-18 NOTE — TELEPHONE ENCOUNTER
Pt requesting office submit Insurance Auth for CT Abdomen previous Auth  2023. Pt states she was unable to get CT performed b/c of lack of transportation. Pt would like to have test performed at [facility] .     Pt would also like to schedule appointment with Dr. ZENG . Does Dr. ZENG want Pt to have CT performed prior to scheduling office visit ? Pt can be reached at phone #[phone]

## 2024-01-19 NOTE — TELEPHONE ENCOUNTER
Spoke with pt and let her know that CT auth was resubmitted. Requested clinical documentation was faxed.  Tracking # [id number]

## 2024-06-09 DIAGNOSIS — E03.8 CENTRAL HYPOTHYROIDISM: ICD-10-CM

## 2024-06-10 RX ORDER — ACETAMINOPHEN 500 MG
500 TABLET ORAL EVERY 6 HOURS PRN
COMMUNITY
Start: 2024-02-22

## 2024-06-10 RX ORDER — LIDOCAINE 50 MG/G
1 PATCH TOPICAL DAILY
COMMUNITY
Start: 2024-02-22

## 2024-06-10 RX ORDER — SEMAGLUTIDE 0.68 MG/ML
0.5 INJECTION, SOLUTION SUBCUTANEOUS WEEKLY
COMMUNITY
Start: 2024-02-07

## 2024-06-10 RX ORDER — BUPROPION HYDROCHLORIDE 150 MG/1
150 TABLET, EXTENDED RELEASE ORAL 2 TIMES DAILY
COMMUNITY
Start: 2024-02-16

## 2024-06-10 RX ORDER — ACETAMINOPHEN 500 MG
500 TABLET ORAL EVERY 6 HOURS PRN
COMMUNITY
Start: 2024-04-13

## 2024-06-10 RX ORDER — FLUPHENAZINE HYDROCHLORIDE 2.5 MG/1
TABLET ORAL
COMMUNITY
Start: 2024-04-25

## 2024-06-10 RX ORDER — LOPERAMIDE HYDROCHLORIDE 2 MG/1
CAPSULE ORAL
COMMUNITY
Start: 2024-05-15

## 2024-06-10 RX ORDER — CYCLOBENZAPRINE HCL 5 MG
TABLET ORAL
COMMUNITY
Start: 2024-05-30

## 2024-06-10 RX ORDER — BUPROPION HYDROCHLORIDE 150 MG/1
300 TABLET, EXTENDED RELEASE ORAL DAILY
COMMUNITY
Start: 2024-05-08

## 2024-06-10 RX ORDER — LEVOTHYROXINE SODIUM 100 UG/1
100 TABLET ORAL DAILY
Qty: 90 TABLET | Refills: 1 | Status: SHIPPED | OUTPATIENT
Start: 2024-06-10 | End: 2024-09-09

## 2024-06-10 RX ORDER — SEMAGLUTIDE 1.34 MG/ML
INJECTION, SOLUTION SUBCUTANEOUS
COMMUNITY
Start: 2024-05-30

## 2024-06-10 RX ORDER — DOCUSATE SODIUM 100 MG/1
100 CAPSULE, LIQUID FILLED ORAL 2 TIMES DAILY
COMMUNITY
Start: 2024-05-30

## 2024-06-10 RX ORDER — LIDOCAINE 50 MG/G
PATCH TOPICAL
COMMUNITY
Start: 2024-04-09

## 2024-06-10 RX ORDER — TRAZODONE HYDROCHLORIDE 100 MG/1
TABLET ORAL
COMMUNITY
Start: 2024-04-24

## 2024-06-10 RX ORDER — POLYETHYLENE GLYCOL 3350 17 G/17G
POWDER, FOR SOLUTION ORAL
COMMUNITY
Start: 2024-05-30

## 2024-06-10 RX ORDER — CITALOPRAM 20 MG/1
TABLET, FILM COATED ORAL
COMMUNITY
Start: 2024-04-24

## 2024-06-10 RX ORDER — SEMAGLUTIDE 0.68 MG/ML
INJECTION, SOLUTION SUBCUTANEOUS
COMMUNITY
Start: 2024-05-03

## 2024-06-10 RX ORDER — FLUTICASONE FUROATE, UMECLIDINIUM BROMIDE AND VILANTEROL TRIFENATATE 200; 62.5; 25 UG/1; UG/1; UG/1
POWDER RESPIRATORY (INHALATION)
COMMUNITY
Start: 2024-06-09

## 2024-09-07 DIAGNOSIS — E03.8 CENTRAL HYPOTHYROIDISM: ICD-10-CM

## 2024-09-09 RX ORDER — LEVOTHYROXINE SODIUM 100 UG/1
100 TABLET ORAL DAILY
Qty: 90 TABLET | Refills: 0 | Status: SHIPPED | OUTPATIENT
Start: 2024-09-09